# Patient Record
Sex: FEMALE | Race: WHITE | NOT HISPANIC OR LATINO | Employment: OTHER | URBAN - METROPOLITAN AREA
[De-identification: names, ages, dates, MRNs, and addresses within clinical notes are randomized per-mention and may not be internally consistent; named-entity substitution may affect disease eponyms.]

---

## 2017-12-08 ENCOUNTER — OFFICE VISIT (OUTPATIENT)
Dept: FAMILY MEDICINE CLINIC | Facility: CLINIC | Age: 62
End: 2017-12-08

## 2017-12-08 VITALS
SYSTOLIC BLOOD PRESSURE: 118 MMHG | WEIGHT: 158 LBS | OXYGEN SATURATION: 95 % | HEART RATE: 59 BPM | TEMPERATURE: 98.6 F | HEIGHT: 60 IN | RESPIRATION RATE: 14 BRPM | DIASTOLIC BLOOD PRESSURE: 82 MMHG | BODY MASS INDEX: 31.02 KG/M2

## 2017-12-08 DIAGNOSIS — Z00.00 ENCOUNTER FOR HEALTH MAINTENANCE EXAMINATION: Primary | ICD-10-CM

## 2017-12-08 DIAGNOSIS — R73.09 ELEVATED HEMOGLOBIN A1C: ICD-10-CM

## 2017-12-08 DIAGNOSIS — E78.01 FAMILIAL HYPERCHOLESTEROLEMIA: ICD-10-CM

## 2017-12-08 DIAGNOSIS — E55.9 VITAMIN D DEFICIENCY: ICD-10-CM

## 2017-12-08 LAB
25(OH)D3+25(OH)D2 SERPL-MCNC: 35.7 NG/ML (ref 30–100)
ALBUMIN SERPL-MCNC: 4.5 G/DL (ref 3.5–5.2)
ALBUMIN/GLOB SERPL: 2 G/DL
ALP SERPL-CCNC: 67 U/L (ref 39–117)
ALT SERPL-CCNC: 30 U/L (ref 1–33)
AST SERPL-CCNC: 26 U/L (ref 1–32)
BILIRUB SERPL-MCNC: 0.4 MG/DL (ref 0.1–1.2)
BUN SERPL-MCNC: 12 MG/DL (ref 8–23)
BUN/CREAT SERPL: 15.8 (ref 7–25)
CALCIUM SERPL-MCNC: 9.8 MG/DL (ref 8.6–10.5)
CHLORIDE SERPL-SCNC: 102 MMOL/L (ref 98–107)
CHOLEST SERPL-MCNC: 243 MG/DL (ref 0–200)
CO2 SERPL-SCNC: 26.3 MMOL/L (ref 22–29)
CREAT SERPL-MCNC: 0.76 MG/DL (ref 0.57–1)
GFR SERPLBLD CREATININE-BSD FMLA CKD-EPI: 77 ML/MIN/1.73
GFR SERPLBLD CREATININE-BSD FMLA CKD-EPI: 93 ML/MIN/1.73
GLOBULIN SER CALC-MCNC: 2.3 GM/DL
GLUCOSE SERPL-MCNC: 91 MG/DL (ref 65–99)
HBA1C MFR BLD: 5.5 % (ref 4.8–5.6)
HDLC SERPL-MCNC: 72 MG/DL (ref 40–60)
LDLC SERPL CALC-MCNC: 154 MG/DL (ref 0–100)
POTASSIUM SERPL-SCNC: 4.3 MMOL/L (ref 3.5–5.2)
PROT SERPL-MCNC: 6.8 G/DL (ref 6–8.5)
SODIUM SERPL-SCNC: 140 MMOL/L (ref 136–145)
TRIGL SERPL-MCNC: 83 MG/DL (ref 0–150)
VLDLC SERPL CALC-MCNC: 16.6 MG/DL (ref 5–40)

## 2017-12-08 PROCEDURE — 99396 PREV VISIT EST AGE 40-64: CPT | Performed by: FAMILY MEDICINE

## 2017-12-08 NOTE — PROGRESS NOTES
Subjective   Moriah Morris is a 62 y.o. female.     Chief Complaint   Patient presents with   • Annual Exam     Patient needs a yearly physical.        HPI     Patient is here for health maintenance exam.  She's been doing pretty good over the past year.  She states that there was a small amount of time where she was moving and was eating a lot of foods that were bad for her.  She had some weight gain but she's back on her weight loss plan.  She is adhering to a strict diet and exercise regimen.  A year ago lab work revealed an elevated cholesterol level as well as an elevated hemoglobin A1c.  She states she is only when her family does not have diabetes and denies any tobacco, recreational drug use, or alcohol.  She takes no prescription medications.  She also was found to have vitamin D deficiency which she is taking a supplement 4.  She gets yearly mammograms and Pap smears through her OB/GYN    The following portions of the patient's history were reviewed and updated as appropriate: allergies, current medications, past family history, past medical history, past social history, past surgical history and problem list.    Review of Systems   Constitutional: Negative for activity change.   All other systems reviewed and are negative.      Objective  Vitals:    12/08/17 0839   BP: 118/82   Pulse: 59   Resp: 14   Temp: 98.6 °F (37 °C)   SpO2: 95%        Physical Exam   Constitutional: She is oriented to person, place, and time. She appears well-developed and well-nourished. No distress.   HENT:   Head: Normocephalic and atraumatic.   Right Ear: External ear normal.   Left Ear: External ear normal.   Nose: Nose normal.   Mouth/Throat: Oropharynx is clear and moist.   Eyes: Conjunctivae and EOM are normal. Pupils are equal, round, and reactive to light. Right eye exhibits no discharge. Left eye exhibits no discharge. No scleral icterus.   Neck: Normal range of motion. Neck supple.   Cardiovascular: Normal rate, regular  rhythm and normal heart sounds.  Exam reveals no friction rub.    No murmur heard.  Pulmonary/Chest: Effort normal and breath sounds normal. No respiratory distress. She has no wheezes. She has no rales.   Abdominal: Soft. Bowel sounds are normal. She exhibits no distension. There is no tenderness. There is no rebound and no guarding.   Lymphadenopathy:     She has no cervical adenopathy.   Neurological: She is alert and oriented to person, place, and time.   Skin: Skin is warm and dry. She is not diaphoretic.   Nursing note and vitals reviewed.        Current Outpatient Prescriptions:   •  cholecalciferol (VITAMIN D3) 1000 UNITS tablet, Take 1,000 Units by mouth Daily., Disp: , Rfl:   •  acetaminophen-codeine (TYLENOL #3) 300-30 MG per tablet, Take 1 tablet by mouth every 4 (four) hours as needed for moderate pain (4-6)., Disp: 15 tablet, Rfl: 0  •  naproxen (NAPROSYN) 500 MG tablet, TK 1 T PO BID FOR 30 DAYS, Disp: , Rfl: 5    Procedures    Lab Results (most recent)     None          Assessment/Plan   Moriah was seen today for annual exam.    Diagnoses and all orders for this visit:    Encounter for health maintenance examination    Familial hypercholesterolemia  -     Lipid Panel    Elevated hemoglobin A1c  -     Comprehensive Metabolic Panel  -     Hemoglobin A1c    Vitamin D deficiency  -     Vitamin D 25 Hydroxy     Preventative health maintenance and screening as above.  We'll get labs as above to evaluate current status and adjust treatment plan based on those results.  Encouraged patient to continue with her healthy lifestyle.      Return in about 1 year (around 12/8/2018).      Rajesh Leon MD

## 2017-12-27 ENCOUNTER — OFFICE VISIT (OUTPATIENT)
Dept: FAMILY MEDICINE CLINIC | Facility: CLINIC | Age: 62
End: 2017-12-27

## 2017-12-27 VITALS
HEART RATE: 67 BPM | BODY MASS INDEX: 30.74 KG/M2 | SYSTOLIC BLOOD PRESSURE: 116 MMHG | TEMPERATURE: 98 F | DIASTOLIC BLOOD PRESSURE: 72 MMHG | HEIGHT: 60 IN | WEIGHT: 156.6 LBS | OXYGEN SATURATION: 98 %

## 2017-12-27 DIAGNOSIS — J32.9 SINUSITIS, UNSPECIFIED CHRONICITY, UNSPECIFIED LOCATION: ICD-10-CM

## 2017-12-27 DIAGNOSIS — R68.89 FLU-LIKE SYMPTOMS: Primary | ICD-10-CM

## 2017-12-27 LAB
EXPIRATION DATE: NORMAL
FLUAV AG NPH QL: NEGATIVE
FLUBV AG NPH QL: NEGATIVE
INTERNAL CONTROL: NORMAL
Lab: NORMAL

## 2017-12-27 PROCEDURE — 99213 OFFICE O/P EST LOW 20 MIN: CPT | Performed by: NURSE PRACTITIONER

## 2017-12-27 PROCEDURE — 87804 INFLUENZA ASSAY W/OPTIC: CPT | Performed by: NURSE PRACTITIONER

## 2017-12-27 RX ORDER — AMOXICILLIN 250 MG/5ML
POWDER, FOR SUSPENSION ORAL
Qty: 300 ML | Refills: 0 | Status: SHIPPED | OUTPATIENT
Start: 2017-12-27 | End: 2018-11-14

## 2017-12-27 NOTE — PROGRESS NOTES
"Subjective   Moriah Morris is a 62 y.o. female.     History of Present Illness   Upper Respiratory Infection: Patient complains of symptoms of a URI, possible sinusitis. Symptoms include congestion, cough, irritability, plugged sensation in both ears, sore throat and swollen glands. Onset of symptoms was 6 days ago, unchanged since that time. She also c/o congestion and facial pain for the past 3 days .  She is drinking plenty of fluids. Evaluation to date: none. Treatment to date: antihistamines.    The following portions of the patient's history were reviewed and updated as appropriate: allergies, current medications, past social history and problem list.    Review of Systems   HENT: Positive for congestion, postnasal drip, rhinorrhea, sinus pain and sinus pressure.    Respiratory: Positive for cough.    All other systems reviewed and are negative.      Objective   /72  Pulse 67  Temp 98 °F (36.7 °C) (Oral)   Ht 152.4 cm (60\")  Wt 71 kg (156 lb 9.6 oz)  SpO2 98%  BMI 30.58 kg/m2  Physical Exam   Constitutional: She is oriented to person, place, and time. Vital signs are normal. She appears well-developed and well-nourished. No distress.   HENT:   Head: Normocephalic.   Cardiovascular: Normal rate, regular rhythm and normal heart sounds.    Pulmonary/Chest: Effort normal and breath sounds normal.   Neurological: She is alert and oriented to person, place, and time. Gait normal.   Psychiatric: She has a normal mood and affect. Her behavior is normal. Judgment and thought content normal.   Vitals reviewed.      Assessment/Plan   Problem List Items Addressed This Visit        Respiratory    Sinusitis    Relevant Medications    amoxicillin (AMOXIL) 250 MG/5ML suspension       Other    Flu-like symptoms - Primary    Relevant Orders    POCT Influenza A/B           rto prn    "

## 2017-12-28 ENCOUNTER — TELEPHONE (OUTPATIENT)
Dept: FAMILY MEDICINE CLINIC | Facility: CLINIC | Age: 62
End: 2017-12-28

## 2017-12-28 NOTE — TELEPHONE ENCOUNTER
Patient called said she was seen yesterday was prescribed liquid amoxicillin was told to take it bid and when she picked up prescription it said take 10ml tid. How many times a day is she supposed to take the medication?

## 2018-11-14 ENCOUNTER — OFFICE VISIT (OUTPATIENT)
Dept: FAMILY MEDICINE CLINIC | Facility: CLINIC | Age: 63
End: 2018-11-14

## 2018-11-14 VITALS
TEMPERATURE: 98.5 F | OXYGEN SATURATION: 98 % | SYSTOLIC BLOOD PRESSURE: 128 MMHG | BODY MASS INDEX: 29.84 KG/M2 | HEART RATE: 78 BPM | DIASTOLIC BLOOD PRESSURE: 82 MMHG | HEIGHT: 60 IN | WEIGHT: 152 LBS

## 2018-11-14 DIAGNOSIS — R05.9 COUGH: ICD-10-CM

## 2018-11-14 DIAGNOSIS — J32.0 MAXILLARY SINUSITIS, UNSPECIFIED CHRONICITY: Primary | ICD-10-CM

## 2018-11-14 PROCEDURE — 99214 OFFICE O/P EST MOD 30 MIN: CPT | Performed by: FAMILY MEDICINE

## 2018-11-14 RX ORDER — AMOXICILLIN AND CLAVULANATE POTASSIUM 875; 125 MG/1; MG/1
1 TABLET, FILM COATED ORAL 2 TIMES DAILY
Qty: 20 TABLET | Refills: 0 | Status: SHIPPED | OUTPATIENT
Start: 2018-11-14 | End: 2018-11-14 | Stop reason: SDUPTHER

## 2018-11-14 RX ORDER — MONTELUKAST SODIUM 10 MG/1
TABLET ORAL
Refills: 0 | COMMUNITY
Start: 2018-11-11 | End: 2022-12-13

## 2018-11-14 RX ORDER — BENZONATATE 100 MG/1
CAPSULE ORAL
Refills: 0 | COMMUNITY
Start: 2018-11-08 | End: 2019-03-18

## 2018-11-14 RX ORDER — AZELASTINE HYDROCHLORIDE AND FLUTICASONE PROPIONATE 137; 50 UG/1; UG/1
SPRAY, METERED NASAL
Refills: 2 | COMMUNITY
Start: 2018-11-11 | End: 2022-12-13

## 2018-11-14 RX ORDER — AMOXICILLIN AND CLAVULANATE POTASSIUM 875; 125 MG/1; MG/1
1 TABLET, FILM COATED ORAL 2 TIMES DAILY
Qty: 20 TABLET | Refills: 0 | Status: SHIPPED | OUTPATIENT
Start: 2018-11-14 | End: 2018-11-24

## 2018-11-14 RX ORDER — GUAIFENESIN AND CODEINE PHOSPHATE 100; 10 MG/5ML; MG/5ML
5 SOLUTION ORAL 3 TIMES DAILY PRN
Qty: 236 ML | Refills: 0 | Status: SHIPPED | OUTPATIENT
Start: 2018-11-14 | End: 2019-03-18

## 2018-11-14 RX ORDER — GUAIFENESIN AND CODEINE PHOSPHATE 100; 10 MG/5ML; MG/5ML
5 SOLUTION ORAL 3 TIMES DAILY PRN
Qty: 236 ML | Refills: 0 | Status: SHIPPED | OUTPATIENT
Start: 2018-11-14 | End: 2018-11-14 | Stop reason: SDUPTHER

## 2018-11-14 NOTE — PROGRESS NOTES
Moriah Morris is a 63 y.o. female.     Chief Complaint   Patient presents with   • URI     started as sore throat and congestions  and sinus pressure ,cough doesnt help to sleep     New patient to me, PMH of arthritis but otherwise healthy. PMH reviewed.     Sinusitis   This is a new problem. Episode onset:  2 weeks. The problem has been gradually worsening since onset. There has been no fever. Associated symptoms include congestion, coughing and a sore throat. (Sinus pressure, teeth pain, some post nasal drip and cough) Treatments tried: Tesslon pears, dexamethason, dymista and stayhist. The treatment provided mild relief.        The following portions of the patient's history were reviewed and updated as appropriate: allergies, current medications, past family history, past medical history, past social history, past surgical history and problem list.    Review of Systems   HENT: Positive for congestion, postnasal drip and sore throat.    Respiratory: Positive for cough.    Psychiatric/Behavioral: Positive for sleep disturbance.       Objective  Vitals:    11/14/18 0917   BP: 128/82   Pulse: 78   Temp: 98.5 °F (36.9 °C)   SpO2: 98%        Physical Exam   Constitutional: She is oriented to person, place, and time. She appears well-developed and well-nourished. No distress.   HENT:   Head: Normocephalic.   Right Ear: External ear normal.   Left Ear: External ear normal.   Mouth/Throat: Oropharynx is clear and moist. No oropharyngeal exudate.   R maxillary sinus tenderness, erythema in the posterior oropharynx.   Eyes: Conjunctivae are normal. Pupils are equal, round, and reactive to light.   Cardiovascular: Normal rate, regular rhythm, normal heart sounds and intact distal pulses.   No murmur heard.  Pulmonary/Chest: Effort normal and breath sounds normal. No respiratory distress.   Abdominal: Soft. Bowel sounds are normal. She exhibits no distension.   Neurological: She is alert and oriented to person, place, and  time.   Skin: Skin is warm and dry.   Psychiatric: She has a normal mood and affect. Her behavior is normal. Judgment and thought content normal.   Nursing note and vitals reviewed.        Current Outpatient Medications:   •  acetaminophen-codeine (TYLENOL #3) 300-30 MG per tablet, Take 1 tablet by mouth every 4 (four) hours as needed for moderate pain (4-6)., Disp: 15 tablet, Rfl: 0  •  benzonatate (TESSALON) 100 MG capsule, TAKE 1 CAPSULE BY MOUTH THREE TIMES A DAY *DO NOT BREAK CHEW, DISSOLVE, CUT, OR CRUSH*, Disp: , Rfl: 0  •  cholecalciferol (VITAMIN D3) 1000 UNITS tablet, Take 1,000 Units by mouth Daily., Disp: , Rfl:   •  DYMISTA 137-50 MCG/ACT suspension, 1 SPRAY INTO EACH NOSTRIL 2 (TWO) TIMES A DAY FOR 30 DAYS., Disp: , Rfl: 2  •  montelukast (SINGULAIR) 10 MG tablet, TAKE 1 TABLET (10 MG TOTAL) BY MOUTH NIGHTLY FOR 90 DAYS., Disp: , Rfl: 0  •  amoxicillin-clavulanate (AUGMENTIN) 875-125 MG per tablet, Take 1 tablet by mouth 2 (Two) Times a Day for 10 days., Disp: 20 tablet, Rfl: 0  •  guaifenesin-codeine (GUAIFENESIN AC) 100-10 MG/5ML liquid, Take 5 mL by mouth 3 (Three) Times a Day As Needed for Cough., Disp: 236 mL, Rfl: 0    Procedures    Lab Results (most recent)     None              Moriah was seen today for uri.    Diagnoses and all orders for this visit:    Maxillary sinusitis, unspecified chronicity  -     amoxicillin-clavulanate (AUGMENTIN) 875-125 MG per tablet; Take 1 tablet by mouth 2 (Two) Times a Day for 10 days.    Cough  -     guaifenesin-codeine (GUAIFENESIN AC) 100-10 MG/5ML liquid; Take 5 mL by mouth 3 (Three) Times a Day As Needed for Cough.    Other orders  -     Discontinue: amoxicillin-clavulanate (AUGMENTIN) 875-125 MG per tablet; Take 1 tablet by mouth 2 (Two) Times a Day for 10 days.  -     Discontinue: guaifenesin-codeine (GUAIFENESIN AC) 100-10 MG/5ML liquid; Take 5 mL by mouth 3 (Three) Times a Day As Needed for Cough.    New patient to me, Sinus infection not improving with  supportive care, antibiotic prescribed, no allergies or issues with HTN.  She does have difficulty with swallowing tablets.  Recommended to crush the tablet.  Cough not improving with tessalon pearls - unable to sleep, codeine cough syrup  Prescribed.  Flu shot UTD. Fu in the office if not improving.     Return if symptoms worsen or fail to improve.      Jenny Elias MD

## 2019-02-22 ENCOUNTER — OFFICE VISIT (OUTPATIENT)
Dept: FAMILY MEDICINE CLINIC | Facility: CLINIC | Age: 64
End: 2019-02-22

## 2019-02-22 VITALS
DIASTOLIC BLOOD PRESSURE: 86 MMHG | TEMPERATURE: 97.5 F | HEIGHT: 60 IN | OXYGEN SATURATION: 98 % | BODY MASS INDEX: 31.02 KG/M2 | HEART RATE: 61 BPM | SYSTOLIC BLOOD PRESSURE: 132 MMHG | WEIGHT: 158 LBS

## 2019-02-22 DIAGNOSIS — H91.92 HEARING LOSS OF LEFT EAR, UNSPECIFIED HEARING LOSS TYPE: ICD-10-CM

## 2019-02-22 DIAGNOSIS — J06.9 ACUTE URI: Primary | ICD-10-CM

## 2019-02-22 PROCEDURE — 99213 OFFICE O/P EST LOW 20 MIN: CPT | Performed by: FAMILY MEDICINE

## 2019-02-22 NOTE — PROGRESS NOTES
Moriah Morris is a 63 y.o. female.     Chief Complaint   Patient presents with   • Sinus Problem     3 days with sinus congestiosn and sinus pain  ,no fever ,cough runny nose        HPI     Pt is a pleasant 63 y.o. YO female here for sinus pressure.      Moriah Morris 63 y.o. female who presents for evaluation of sinus pressure and congestion. Symptoms include congestion, sore throat, dry cough and runny nose.  Onset of symptoms was 3 days ago, unchanged since that time. Patient denies shortness of breath, dyspnea on exertion, sinus pain, high fever.   Evaluation to date: none Treatment to date:  nasal steroid sprary.     The following portions of the patient's history were reviewed and updated as appropriate: allergies, current medications, past family history, past medical history, past social history, past surgical history and problem list.    Review of Systems   HENT: Positive for congestion, postnasal drip, rhinorrhea, sinus pressure and sinus pain.    Eyes: Negative.    Respiratory: Positive for cough.    Cardiovascular: Negative for chest pain, palpitations and leg swelling.   Endocrine: Negative.    Musculoskeletal: Negative.    Allergic/Immunologic: Negative.    Neurological: Negative.    Hematological: Negative.    Psychiatric/Behavioral: Negative.        Objective  Vitals:    02/22/19 1015   BP: 132/86   Pulse: 61   Temp: 97.5 °F (36.4 °C)   SpO2: 98%        Physical Exam   Constitutional: She is oriented to person, place, and time. She appears well-developed and well-nourished. No distress.   HENT:   Head: Normocephalic.   Right Ear: External ear normal.   Left Ear: External ear normal.   Nose: Nose normal.   Mouth/Throat: Oropharynx is clear and moist. No oropharyngeal exudate.   No sinus tenderness, erythema in the posterior oropharynx.   Eyes: Conjunctivae and EOM are normal. Pupils are equal, round, and reactive to light.   Cardiovascular: Normal rate, regular rhythm, normal heart sounds and intact  distal pulses.   No murmur heard.  Pulmonary/Chest: Effort normal and breath sounds normal. No respiratory distress.   Abdominal: Soft. Bowel sounds are normal. She exhibits no distension.   Musculoskeletal: Normal range of motion.   Neurological: She is alert and oriented to person, place, and time.   Skin: Skin is warm and dry. No rash noted.   Psychiatric: She has a normal mood and affect. Her behavior is normal. Judgment and thought content normal.   Nursing note and vitals reviewed.        Current Outpatient Medications:   •  acetaminophen-codeine (TYLENOL #3) 300-30 MG per tablet, Take 1 tablet by mouth every 4 (four) hours as needed for moderate pain (4-6)., Disp: 15 tablet, Rfl: 0  •  cholecalciferol (VITAMIN D3) 1000 UNITS tablet, Take 1,000 Units by mouth Daily., Disp: , Rfl:   •  DYMISTA 137-50 MCG/ACT suspension, 1 SPRAY INTO EACH NOSTRIL 2 (TWO) TIMES A DAY FOR 30 DAYS., Disp: , Rfl: 2  •  benzonatate (TESSALON) 100 MG capsule, TAKE 1 CAPSULE BY MOUTH THREE TIMES A DAY *DO NOT BREAK CHEW, DISSOLVE, CUT, OR CRUSH*, Disp: , Rfl: 0  •  Cholecalciferol 2000 units capsule, Take  by mouth., Disp: , Rfl:   •  guaifenesin-codeine (GUAIFENESIN AC) 100-10 MG/5ML liquid, Take 5 mL by mouth 3 (Three) Times a Day As Needed for Cough., Disp: 236 mL, Rfl: 0  •  montelukast (SINGULAIR) 10 MG tablet, TAKE 1 TABLET (10 MG TOTAL) BY MOUTH NIGHTLY FOR 90 DAYS., Disp: , Rfl: 0    Procedures    Lab Results (most recent)     None              Moriah was seen today for sinus problem.    Diagnoses and all orders for this visit:    Acute URI    Hearing loss of left ear, unspecified hearing loss type  -     Ambulatory Referral to ENT (Otolaryngology)      Acute upper respiratory infection, continue supportive care with Flonase, Mucinex and vitamins.  Rest hydration recommended.  Additional findings of sudden loss of hearing that has been present for a couple months.  She feels that hearing on her left side is been muffled.  Will  refer to ENT for further evaluation of asymmetric hearing loss.    Return if symptoms worsen or fail to improve.      Jenny Elias MD

## 2019-03-18 ENCOUNTER — OFFICE VISIT (OUTPATIENT)
Dept: FAMILY MEDICINE CLINIC | Facility: CLINIC | Age: 64
End: 2019-03-18

## 2019-03-18 VITALS
BODY MASS INDEX: 30.63 KG/M2 | OXYGEN SATURATION: 96 % | SYSTOLIC BLOOD PRESSURE: 126 MMHG | TEMPERATURE: 98 F | HEART RATE: 56 BPM | HEIGHT: 60 IN | DIASTOLIC BLOOD PRESSURE: 78 MMHG | WEIGHT: 156 LBS

## 2019-03-18 DIAGNOSIS — Z00.00 HEALTH MAINTENANCE EXAMINATION: Primary | ICD-10-CM

## 2019-03-18 DIAGNOSIS — Z12.39 SCREENING FOR BREAST CANCER: ICD-10-CM

## 2019-03-18 DIAGNOSIS — Z13.1 SCREENING FOR DIABETES MELLITUS: ICD-10-CM

## 2019-03-18 DIAGNOSIS — Z13.220 SCREENING FOR LIPID DISORDERS: ICD-10-CM

## 2019-03-18 DIAGNOSIS — E55.9 HYPOVITAMINOSIS D: ICD-10-CM

## 2019-03-18 PROBLEM — R68.89 FLU-LIKE SYMPTOMS: Status: RESOLVED | Noted: 2017-12-27 | Resolved: 2019-03-18

## 2019-03-18 PROBLEM — J32.9 SINUSITIS: Status: RESOLVED | Noted: 2017-12-27 | Resolved: 2019-03-18

## 2019-03-18 LAB
25(OH)D3+25(OH)D2 SERPL-MCNC: 54.8 NG/ML (ref 30–100)
ALBUMIN SERPL-MCNC: 4.4 G/DL (ref 3.5–5.2)
ALBUMIN/GLOB SERPL: 2 G/DL
ALP SERPL-CCNC: 62 U/L (ref 39–117)
ALT SERPL-CCNC: 26 U/L (ref 1–33)
AST SERPL-CCNC: 23 U/L (ref 1–32)
BILIRUB SERPL-MCNC: 0.2 MG/DL (ref 0.2–1.2)
BUN SERPL-MCNC: 11 MG/DL (ref 8–23)
BUN/CREAT SERPL: 16.2 (ref 7–25)
CALCIUM SERPL-MCNC: 10 MG/DL (ref 8.6–10.5)
CHLORIDE SERPL-SCNC: 101 MMOL/L (ref 98–107)
CHOLEST SERPL-MCNC: 230 MG/DL (ref 0–200)
CO2 SERPL-SCNC: 26 MMOL/L (ref 22–29)
CREAT SERPL-MCNC: 0.68 MG/DL (ref 0.57–1)
GLOBULIN SER CALC-MCNC: 2.2 GM/DL
GLUCOSE SERPL-MCNC: 97 MG/DL (ref 65–99)
HBA1C MFR BLD: 5.68 % (ref 4.8–5.6)
HDLC SERPL-MCNC: 75 MG/DL (ref 40–60)
LDLC SERPL CALC-MCNC: 141 MG/DL (ref 0–100)
POTASSIUM SERPL-SCNC: 4.6 MMOL/L (ref 3.5–5.2)
PROT SERPL-MCNC: 6.6 G/DL (ref 6–8.5)
SODIUM SERPL-SCNC: 141 MMOL/L (ref 136–145)
TRIGL SERPL-MCNC: 68 MG/DL (ref 0–150)
VLDLC SERPL CALC-MCNC: 13.6 MG/DL (ref 5–40)

## 2019-03-18 PROCEDURE — 99396 PREV VISIT EST AGE 40-64: CPT | Performed by: FAMILY MEDICINE

## 2019-03-18 NOTE — PROGRESS NOTES
Moriah Morris is a 63 y.o. female.     Chief Complaint   Patient presents with   • Establish Care     new pt establishing with dr patel    • Annual Exam     no complains       HPI     Pt is a pleasant 63 y.o. YO female here for Annual Exam.  PMH includes hyperglycemia.    Health Maintenance   Topic Date Due   • ZOSTER VACCINE (1 of 2) 06/14/2004   • HEPATITIS C SCREENING  05/20/2016   • MAMMOGRAM  05/20/2016   • LIPID PANEL  12/08/2018   • ANNUAL PHYSICAL  12/09/2018   • PAP SMEAR  07/07/2020   • TDAP/TD VACCINES (2 - Td) 05/12/2024   • COLONOSCOPY  12/14/2025   • INFLUENZA VACCINE  Completed     Diet: Diet with lots of sweets.   Exercise: Plays tennis, yoga, cycles and walks (40 mils/ week)     Seeing ortho on Thursday with knee pain - improving will cancel.   Uses IC/ Ibuprofen and rest.     The following portions of the patient's history were reviewed and updated as appropriate: allergies, current medications, past family history, past medical history, past social history, past surgical history and problem list.    Review of Systems   Constitutional: Negative.    HENT: Negative.    Eyes: Negative.    Respiratory: Negative.    Cardiovascular: Negative for chest pain, palpitations and leg swelling.   Gastrointestinal: Negative.    Endocrine: Negative.    Genitourinary: Negative.    Musculoskeletal: Positive for arthralgias (knees) and myalgias.   Allergic/Immunologic: Negative.    Neurological: Negative.    Hematological: Negative.    Psychiatric/Behavioral: Negative.        Objective  Vitals:    03/18/19 0820   BP: 126/78   Pulse: 56   Temp: 98 °F (36.7 °C)   SpO2: 96%        Physical Exam   Constitutional: She is oriented to person, place, and time. She appears well-developed and well-nourished. No distress.   HENT:   Head: Normocephalic.   Nose: Nose normal.   Eyes: EOM are normal.   Neck: No thyromegaly present.   Cardiovascular: Normal rate, regular rhythm, normal heart sounds and intact distal pulses.   No  murmur heard.  Pulmonary/Chest: Effort normal and breath sounds normal. No respiratory distress.   Musculoskeletal: Normal range of motion.   Neurological: She is alert and oriented to person, place, and time.   Skin: Skin is warm and dry. No rash noted.   Psychiatric: She has a normal mood and affect. Her behavior is normal. Judgment and thought content normal.   Nursing note and vitals reviewed.        Current Outpatient Medications:   •  cholecalciferol (VITAMIN D3) 1000 UNITS tablet, Take 1,000 Units by mouth Daily., Disp: , Rfl:   •  Cholecalciferol 2000 units capsule, Take  by mouth., Disp: , Rfl:   •  DYMISTA 137-50 MCG/ACT suspension, 1 SPRAY INTO EACH NOSTRIL 2 (TWO) TIMES A DAY FOR 30 DAYS., Disp: , Rfl: 2  •  montelukast (SINGULAIR) 10 MG tablet, TAKE 1 TABLET (10 MG TOTAL) BY MOUTH NIGHTLY FOR 90 DAYS., Disp: , Rfl: 0    Procedures    Lab Results (most recent)     None              Moriah was seen today for establish care and annual exam.    Diagnoses and all orders for this visit:    Health maintenance examination    Screening for lipid disorders  -     Lipid Panel    Screening for breast cancer  -     Mammo Screening Bilateral With CAD; Future    Screening for diabetes mellitus  -     Hemoglobin A1c  -     Comprehensive Metabolic Panel    Hypovitaminosis D  -     Vitamin D 25 Hydroxy    Patient here to establish care with me.  Establish to me in this office.  Health maintenance visit today, discussed decreasing sweets intake.  She is very active, continue this.  Labs as above.  Previous hyperglycemia with hemoglobin A1c of 5.6 that improved thereafter.  We will recheck level today.  She has never been sexually active, no history of abnormal Paps, last one was about 3 years ago, not due at this time.  Breast cancer screening as above.  Colonoscopy up-to-date.  No other immunizations needed at this time.  Shingles pending when available.  Follow-up if not improving or having new problems.  Otherwise  annually is okay.      Return if symptoms worsen or fail to improve.      Jenny Elias MD

## 2019-03-20 NOTE — PROGRESS NOTES
Please call patient back with results.  The labs has resulted as overall normal, cholesterol slightly elevated but ASCVD risk of 4.6% therefore I would not recommend medication at this time but lifestyle choices.  Additionally your hemoglobin A1c is slightly elevated compared to last year, in the prediabetic range.  Lifestyle changes as well.  We can recheck these in 6 months..    Thank you

## 2021-02-01 ENCOUNTER — IMMUNIZATION (OUTPATIENT)
Dept: VACCINE CLINIC | Facility: HOSPITAL | Age: 66
End: 2021-02-01

## 2021-02-01 PROCEDURE — 91300 HC SARSCOV02 VAC 30MCG/0.3ML IM: CPT | Performed by: INTERNAL MEDICINE

## 2021-02-01 PROCEDURE — 0001A: CPT | Performed by: INTERNAL MEDICINE

## 2021-02-22 ENCOUNTER — IMMUNIZATION (OUTPATIENT)
Dept: VACCINE CLINIC | Facility: HOSPITAL | Age: 66
End: 2021-02-22

## 2021-02-22 PROCEDURE — 0002A: CPT | Performed by: INTERNAL MEDICINE

## 2021-02-22 PROCEDURE — 91300 HC SARSCOV02 VAC 30MCG/0.3ML IM: CPT | Performed by: INTERNAL MEDICINE

## 2022-11-10 ENCOUNTER — OFFICE VISIT (OUTPATIENT)
Dept: ORTHOPEDIC SURGERY | Facility: CLINIC | Age: 67
End: 2022-11-10

## 2022-11-10 ENCOUNTER — TELEPHONE (OUTPATIENT)
Dept: FAMILY MEDICINE CLINIC | Facility: CLINIC | Age: 67
End: 2022-11-10

## 2022-11-10 VITALS — HEIGHT: 61 IN | WEIGHT: 168.2 LBS | TEMPERATURE: 97.5 F | BODY MASS INDEX: 31.75 KG/M2

## 2022-11-10 DIAGNOSIS — R52 PAIN: Primary | ICD-10-CM

## 2022-11-10 DIAGNOSIS — M17.11 PRIMARY OSTEOARTHRITIS OF RIGHT KNEE: ICD-10-CM

## 2022-11-10 PROCEDURE — 99203 OFFICE O/P NEW LOW 30 MIN: CPT | Performed by: NURSE PRACTITIONER

## 2022-11-10 PROCEDURE — 20610 DRAIN/INJ JOINT/BURSA W/O US: CPT | Performed by: NURSE PRACTITIONER

## 2022-11-10 PROCEDURE — 73562 X-RAY EXAM OF KNEE 3: CPT | Performed by: NURSE PRACTITIONER

## 2022-11-10 RX ORDER — MULTIVIT-MIN/IRON/FOLIC ACID/K 18-600-40
CAPSULE ORAL
COMMUNITY
Start: 2019-10-01

## 2022-11-10 RX ORDER — METHYLPREDNISOLONE ACETATE 80 MG/ML
80 INJECTION, SUSPENSION INTRA-ARTICULAR; INTRALESIONAL; INTRAMUSCULAR; SOFT TISSUE
Status: COMPLETED | OUTPATIENT
Start: 2022-11-10 | End: 2022-11-10

## 2022-11-10 RX ORDER — NAPROXEN 500 MG/1
TABLET ORAL
COMMUNITY
Start: 2021-10-01 | End: 2022-11-12

## 2022-11-10 RX ADMIN — METHYLPREDNISOLONE ACETATE 80 MG: 80 INJECTION, SUSPENSION INTRA-ARTICULAR; INTRALESIONAL; INTRAMUSCULAR; SOFT TISSUE at 19:04

## 2022-11-10 NOTE — TELEPHONE ENCOUNTER
Caller: Moriah Morris    Relationship: Self    Best call back number: [phone]      What was the call regarding: PATIENT WOULD LIKE TO SEE IF DR SCRUGGS IS WILLING TO TAKE HER BACK ON AS A PATIENT. PATIENT HAS NOT BEEN SEEN SINCE MARCH OF 2019. PATIENT RECENTLY HAS SEEN AN ORTHOPEDIC AND IS NOW NEEDING LABS DONE TO START A NEW MEDICATION        PLEASE CALL AND ADVISE     Do you require a callback: YES

## 2022-11-10 NOTE — PROGRESS NOTES
Patient: Moriah Morris  YOB: 1955 67 y.o. female  Medical Record Number: [id number]    Chief Complaints:   Chief Complaint   Patient presents with   • Right Knee - Initial Evaluation, Pain       History of Present Illness:Moriah Morris is a 67 y.o. female who presents as a new patient to our practice with complaints of having right knee pain that is chronic in nature.  Patient states her symptoms started in July of this year and have progressively worsened over the last week or so.  Patient denies any recent specific injuries.  Reports the pain is located to the medial aspect of her knee and describes it as a moderate to severe ache.  He states as of recently the pain is becoming pretty consistent which is causing her difficulty walking.  She states that she has had a previous torn meniscus in the left knee.  Patient is currently been treated by Dr. Chaz Mcdaniels in which she has been conservatively treating her with naproxen.  Patient reports that this only gives her some relief.  He denies any signs or symptoms of infection, and she is without any other significant complaints today.    Allergies: No Known Allergies    Medications:   Current Outpatient Medications   Medication Sig Dispense Refill   • Cholecalciferol (Vitamin D) 50 MCG (2000 UT) capsule      • naproxen (NAPROSYN) 500 MG tablet      • cholecalciferol (VITAMIN D3) 1000 UNITS tablet Take 1,000 Units by mouth Daily.     • Cholecalciferol 2000 units capsule Take  by mouth.     • DYMISTA 137-50 MCG/ACT suspension 1 SPRAY INTO EACH NOSTRIL 2 (TWO) TIMES A DAY FOR 30 DAYS.  2   • montelukast (SINGULAIR) 10 MG tablet TAKE 1 TABLET (10 MG TOTAL) BY MOUTH NIGHTLY FOR 90 DAYS.  0     No current facility-administered medications for this visit.         The following portions of the patient's history were reviewed and updated as appropriate: allergies, current medications, past family history, past medical history, past social history, past  "surgical history and problem list.    Review of Systems:   A 14 point review of systems was performed. All systems negative except pertinent positives/negative listed in HPI above    Physical Exam:   Vitals:    11/10/22 1322   Temp: 97.5 °F (36.4 °C)   TempSrc: Temporal   Weight: 76.3 kg (168 lb 3.2 oz)   Height: 154.9 cm (61\")   PainSc:   7   PainLoc: Knee       General: A and O x 3, ASA, NAD    SCLERA:    Normal    DENTITION:   Normal    Knee:  right    ALIGNMENT:     Varus  ,   Patella  tracks  midline    GAIT:    Antalgic    SKIN:    No abnormality    RANGE OF MOTION:   0  - 120 DEG; painful extension    STRENGTH:   4  / 5    LIGAMENTS:    No varus / valgus instability.   Negative  Lachman.    MENISCUS:     Negative   Nyla       DISTAL PULSES:    Paplable    DISTAL SENSATION :   Intact    LYMPHATICS:     No   lymphadenopathy    OTHER:          - Positive   effusion      - Crepitance with ROM       - Medial joint line tenderness      - Palpable Baker's cyst    Radiology:  Xrays 3views (ap,lateral, sunrise) were ordered and reviewed for evaluation of knee pain demonstrating advanced varus osteoarthritis with bone on bone articulation, subchondral cysts, and periarticular osteophytes as well as advanced patellofemoral osteoarthritis.  No other x-rays available for comparison purposes.    Assessment/Plan: Primary osteoarthritis right knee    Treatment options as well as imaging results were discussed in detail with the patient.  At this point in time again to proceed forward with conservative measures.  I am going to give the patient a prescription for physical therapy to work on quad strengthening and range of motion exercises.  I am also going give her a prescription for bilateral lateral heel wedges as she does have advanced osteoarthritis bilateral knees.  I did discuss giving the patient an anti-inflammatory medication such as meloxicam or Celebrex, however it is been 3 years since the patient's had a BMP " lab drawn therefore I instructed her to contact her PCP before taking a daily anti-inflammatory.  I will give her an intra-articular joint injection of cortisone into her right knee today as well.  She can follow back up with me in 3 months for reevaluation.    Large Joint Arthrocentesis: R knee  Date/Time: 11/10/2022 7:04 PM  Consent given by: patient  Site marked: site marked  Timeout: Immediately prior to procedure a time out was called to verify the correct patient, procedure, equipment, support staff and site/side marked as required   Supporting Documentation  Indications: pain and joint swelling   Procedure Details  Location: knee - R knee  Preparation: Patient was prepped and draped in the usual sterile fashion  Needle size: 22 G  Approach: anterolateral  Medications administered: 80 mg methylPREDNISolone acetate 80 MG/ML; 3 mL lidocaine (cardiac)  Patient tolerance: patient tolerated the procedure well with no immediate complications            Agustín Gtz, EMERITA  11/10/2022

## 2022-11-11 ENCOUNTER — TELEPHONE (OUTPATIENT)
Dept: ORTHOPEDIC SURGERY | Facility: CLINIC | Age: 67
End: 2022-11-11

## 2022-11-11 ENCOUNTER — LAB (OUTPATIENT)
Dept: LAB | Facility: HOSPITAL | Age: 67
End: 2022-11-11

## 2022-11-11 ENCOUNTER — PATIENT ROUNDING (BHMG ONLY) (OUTPATIENT)
Dept: ORTHOPEDIC SURGERY | Facility: CLINIC | Age: 67
End: 2022-11-11

## 2022-11-11 DIAGNOSIS — M17.11 PRIMARY OSTEOARTHRITIS OF RIGHT KNEE: Primary | ICD-10-CM

## 2022-11-11 DIAGNOSIS — M17.11 PRIMARY OSTEOARTHRITIS OF RIGHT KNEE: ICD-10-CM

## 2022-11-11 LAB
ANION GAP SERPL CALCULATED.3IONS-SCNC: 13.4 MMOL/L (ref 5–15)
BUN SERPL-MCNC: 18 MG/DL (ref 8–23)
BUN/CREAT SERPL: 22.2 (ref 7–25)
CALCIUM SPEC-SCNC: 9.8 MG/DL (ref 8.6–10.5)
CHLORIDE SERPL-SCNC: 102 MMOL/L (ref 98–107)
CO2 SERPL-SCNC: 22.6 MMOL/L (ref 22–29)
CREAT SERPL-MCNC: 0.81 MG/DL (ref 0.57–1)
EGFRCR SERPLBLD CKD-EPI 2021: 79.2 ML/MIN/1.73
GLUCOSE SERPL-MCNC: 140 MG/DL (ref 65–99)
POTASSIUM SERPL-SCNC: 4.1 MMOL/L (ref 3.5–5.2)
SODIUM SERPL-SCNC: 138 MMOL/L (ref 136–145)

## 2022-11-11 PROCEDURE — 80048 BASIC METABOLIC PNL TOTAL CA: CPT

## 2022-11-11 PROCEDURE — 36415 COLL VENOUS BLD VENIPUNCTURE: CPT

## 2022-11-11 NOTE — TELEPHONE ENCOUNTER
Unfortunately next site where I could work her in as an inpatient would be in June.  I would recommend that she see one of the 2 physicians that are in our practice that also will take great care of her.

## 2022-11-11 NOTE — PROGRESS NOTES
A Jade Magnet Message has been sent to the patient for PATIENT ROUNDING with [facility]

## 2022-11-11 NOTE — TELEPHONE ENCOUNTER
I called and spoke to the patient in regards to her questions and concerns.  Patient is unfortunately not able to get into see her PCP until June.  Therefore I am going to order a BMP lab to evaluate the patient's creatinine level.  Should she be in a normal range and we will start her on Celebrex.  I have encouraged her to keep her appointment with her doctor in June and they can take over prescribing this medicine going forward at that time if her labs are normal.

## 2022-11-11 NOTE — TELEPHONE ENCOUNTER
Provider: EMERITA PARSONS    Caller: RUDDY THORNTON    Relationship to Patient: SELF    Phone Number: [phone]    Reason for Call: PT STATED THAT MARITO EXPLAINED TO HER THAT SHE WILL NEED LABS DRAWN PRIOR TO HER STARTING CELEBREX. PT CALLED HER PCP BUT THEY CAN NOT GET HER IN UNTIL June. SHE IS WANTING TO KNOW IF SHE CAN GO TO A [place] FACILITY TO GET LABS OR AN IMMEDIATE CARE?    PLEASE CALL PATIENT AND ADVISE. THANK YOU

## 2022-11-12 RX ORDER — CELECOXIB 200 MG/1
200 CAPSULE ORAL DAILY
Qty: 30 CAPSULE | Refills: 2 | Status: SHIPPED | OUTPATIENT
Start: 2022-11-12 | End: 2023-02-08

## 2022-11-12 NOTE — PROGRESS NOTES
I called and notified the patient that her lab works were normal for kidney function.  Therefore I went ahead and sent in her prescription for Celebrex 200 mg p.o. daily as needed for arthritic pain.  She gets back established with her family doctor they can take over managing this prescription.

## 2022-11-16 ENCOUNTER — TELEPHONE (OUTPATIENT)
Dept: ORTHOPEDIC SURGERY | Facility: CLINIC | Age: 67
End: 2022-11-16

## 2022-11-16 NOTE — TELEPHONE ENCOUNTER
Provider: ANGELITA  Caller: RUDDY THORNTON  Relationship to Patient: PATIENT    Phone Number: [phone]    Reason for Call: PATIENT NEEDS TO KNOW HOW LONG TO KEEP TAKING THE CELEBREX    When was the patient last seen: 11.10.22

## 2022-11-21 ENCOUNTER — TREATMENT (OUTPATIENT)
Dept: PHYSICAL THERAPY | Facility: CLINIC | Age: 67
End: 2022-11-21

## 2022-11-21 DIAGNOSIS — M25.561 ACUTE PAIN OF RIGHT KNEE: Primary | ICD-10-CM

## 2022-11-21 DIAGNOSIS — Z74.09 IMPAIRED FUNCTIONAL MOBILITY AND ACTIVITY TOLERANCE: ICD-10-CM

## 2022-11-21 PROCEDURE — 97162 PT EVAL MOD COMPLEX 30 MIN: CPT | Performed by: PHYSICAL THERAPIST

## 2022-11-21 PROCEDURE — 97530 THERAPEUTIC ACTIVITIES: CPT | Performed by: PHYSICAL THERAPIST

## 2022-11-21 PROCEDURE — 97140 MANUAL THERAPY 1/> REGIONS: CPT | Performed by: PHYSICAL THERAPIST

## 2022-11-21 PROCEDURE — 97110 THERAPEUTIC EXERCISES: CPT | Performed by: PHYSICAL THERAPIST

## 2022-11-21 NOTE — PROGRESS NOTES
[facility] [place]  [address] [address]    Physical Therapy Initial Evaluation and Plan of Care    Patient: Moriah Morris   : 1955  Diagnosis/ICD-10 Code:  Acute pain of right knee [M25.561]  Referring practitioner: EMERITA Mg  NPI: [id number]                                      Date of Initial Visit: 2022  Today's Date: 2022  Patient seen for 1 session         Visit Diagnoses:    ICD-10-CM ICD-9-CM   1. Acute pain of right knee  M25.561 719.46   2. Impaired functional mobility and activity tolerance  Z74.09 V49.89         Subjective Questionnaire: WOMAC 17/96 - 17.7%       Subjective Evaluation    History of Present Illness  Date of onset: 2022  Mechanism of injury: R knee pain - OA - got injection at 11/10 visit with Agustín and it has helped.  Pt reports that she helped her niece move in August and aggravated knee.  Then tried some PT and that was successful.  Then a couple of weeks ago she could barely move her knee after getting out of bed.  Had played tennis and cycled in the days prior with no particular incident.      Pt is an avid outdoor cyclist (for 42 year)- has taken couple of weeks off.  She does tend to get off her bike onto her R leg. She has not changed routes/bike settings.  Had been cycling 4 days/week for 20 miles   Also had been been playing doubles tennis 1-2 days/week and home yoga workouts    - locking/giving way.   + sleep disturbance.  Initially noticed swelling in knees.      Pain increases with activity.      PMH - L ankle surgery      Radiology:  Xrays 3views (ap,lateral, sunrise) were ordered and reviewed for evaluation of knee pain demonstrating advanced varus osteoarthritis with bone on bone articulation, subchondral cysts, and periarticular osteophytes as well as advanced patellofemoral osteoarthritis.  No other x-rays available for comparison purposes.          Patient Occupation: retired -    Pain  Current pain ratin  At worst pain ratin (up to 9 before injection)  Location: R medial knee  Quality: sharp  Alleviating factors: Celebrex - only took for 4 days.  Aggravating factors: sleeping, squatting and ambulation    Social Support  Lives in: one-story house (stairs to basement)    Patient Goals  Patient goals for therapy: return to sport/leisure activities and decreased pain             Objective          Static Posture     Knee   Genu varus.     Tenderness     Right Knee   Tenderness in the medial joint line and pes anserinus.     Active Range of Motion   Left Knee   Flexion: 130 degrees   Extension: 3 degrees     Right Knee   Flexion: 129 degrees   Extension: 5 degrees with pain    Additional Active Range of Motion Details  PF crepitus R>L    Strength/Myotome Testing     Left Hip   Planes of Motion   Flexion: 3+ (long lever arm)  Extension: 3+    Right Hip   Planes of Motion   Flexion: 3+ (long lever arm)  Extension: 3+  Abduction: 5  Adduction: 5    Right Knee   Flexion: 5  Extension: 5    Additional Strength Details  BeActivated 123 testing    Sagittal System  Flexibility - HS R 80 degrees L80 degrees                     Psoas MMT tested supine in slight abd/hip ER R 3+/5 L 3+/5                               with ankle PF(quad) R 3+/5 L 3+/5                               with ankle DF (ant tib) R 3+/5 L 3+/5                                 Contralateral arm fist R 5/5 L 5/5                                                 Breathing pattern - diaphragmatic    Glut Max tested prone with knee flex R 3+/5 L 3+/5        Tests     Right Hip   Positive Isabel.     Right Knee   Negative bounce home, medial Nyla and valgus stress test at 30 degrees (but painful).     Ambulation   Weight-Bearing Status   Assistive device used: none    Observational Gait   Gait: within functional limits     Functional Assessment     Single Leg Stance   Left: 30 seconds  Right: 30 seconds    Comments  5TSSTS - 7.64            Assessment & Plan     Assessment  Impairments: abnormal or restricted ROM, activity intolerance, impaired physical strength, lacks appropriate home exercise program and pain with function  Functional Limitations: sleeping, walking, uncomfortable because of pain, standing and stooping  Assessment details: Moriah Morris is a 67 y.o. female referred to physical therapy for R knee pain. She presents with decreased core stability/strength with 333 sh pattern likely impacted by years of cycling, medial knee pain with sit to stand, palp tenderness, B genu varus, extensive PF crepitus and tight IT bands .She presents with an evolving clinical presentation.  She has comorbidities including B knee OA and a very active lifestyle that may affect her progress in the plan of care.  Pt would benefit from skilled PT services in order to address listed impairments and increase tolerance to normal daily activities including ADLs and recreational activities.  During evaluation, pt educated on anatomy, goal of interventions, jt protection, and body mechanics to promote healthy lifestyle and improve quality of life.    Prognosis: good    Goals  Plan Goals: STG In 4 weeks  1. Pt to exhibit compliance/independence with HEP without exacerbation of symptoms.  2. Pt to exhibit increased R knee extension AROM to 3 degrees without pain to ability to get off her bike.  3. Pt will sit to stand without increased R knee pain  4. Pt is able to resume cycling for at least 15 min with acceptable symptoms  5. Patient will be independent with education for symptom management, joint protection and strategies to minimize stress on affected tissues    LTG In 6-8 weeks  1. Pt to exhibit >/= 4+/5 hip/core MMT to demonstrate improved strength to allow for improved tolerance to ambulation and stairclimbing.  2. WOMAC </= 13% to demonstrate improved functional tolerance to ADLs  3. - sleep disturbance  4. Patient will demonstrate an independent HEP for  core and knee strength and flexibility/ROM.      Plan  Therapy options: will be seen for skilled therapy services  Planned modality interventions: ultrasound and cryotherapy  Other planned modality interventions: kinesiotape  Planned therapy interventions: neuromuscular re-education, therapeutic activities, strengthening, stretching, home exercise program, balance/weight-bearing training and manual therapy  Frequency: 2x week  Duration in weeks: 4  Treatment plan discussed with: patient  Plan details: Access Code: [email or web address]  URL: [email or web address]  Date: 11/21/2022  Prepared by: Keya Stuart    Exercises  Straight Leg Raise with External Rotation - 1 x daily - 1 sets - 10 reps - 3 hold  Supine Gluteal Sets - 1 x daily - 1 sets - 10 reps - 5 hold  Supine Bridge - 1 x daily - 1 sets - 15 reps - 5 hold  Supine Hamstring Stretch with Strap - 1 x daily - 1 sets - 2 reps - 30 hold  Supine ITB Stretch with Strap - 1 x daily - 1 sets - 2 reps - 30 hold  Standing Gluteal Sets - 1 x daily - 1 sets - 10 reps - 5 hold  Ice - 1 x daily - 1 sets - 10 min    Patient Education  Knee Osteoarthritis  Hold cycling, yoga and tennis until we can determine response of HEP  Ice after ex  Consider water aerobics        History # of Personal Factors and/or Comorbidities: MODERATE (1-2)  Examination of Body System(s): # of elements: MODERATE (3)  Clinical Presentation: EVOLVING  Clinical Decision Making: MODERATE      Timed:         Manual Therapy:    10     mins  41751;     Therapeutic Exercise:    10     mins  39189;     Neuromuscular Pearl:    -    mins  75421;    Therapeutic Activity:     10     mins  17951;     Gait Training:      -     mins  50517;     Ultrasound:     8     mins  27014;    Ionto                               -    mins   70214  Self Care                       -     mins   30091    Un-Timed:  Electrical Stimulation:    -     mins  44865 (MC );  Dry Needling     -     mins self-pay  Traction     -      mins 27218  Low Eval     -     Mins  50432  Mod Eval     20     Mins  86374  High Eval                       -     Mins  38590    Timed Treatment:   38   mins   Total Treatment:     68   mins    PT: Keya Stuart PT     [place] License # [id number]    Electronically signed by Keya Stuart PT, 11/21/2022, 5:07pm EST    Certification Period: 11/21/2022 thru 2/18/2023  I certify that the therapy services are furnished while this patient is under my care.  The services outlined above are required by this patient, and will be reviewed every 90 days.         Physician Signature:__________________________________________________    PHYSICIAN: Agustín Gtz APRN      DATE:     Please sign and return via fax to [phone]. Thank you, [facility].

## 2022-11-29 ENCOUNTER — TREATMENT (OUTPATIENT)
Dept: PHYSICAL THERAPY | Facility: CLINIC | Age: 67
End: 2022-11-29

## 2022-11-29 DIAGNOSIS — M25.561 ACUTE PAIN OF RIGHT KNEE: Primary | ICD-10-CM

## 2022-11-29 DIAGNOSIS — Z74.09 IMPAIRED FUNCTIONAL MOBILITY AND ACTIVITY TOLERANCE: ICD-10-CM

## 2022-11-29 PROCEDURE — 97110 THERAPEUTIC EXERCISES: CPT | Performed by: PHYSICAL THERAPIST

## 2022-11-29 PROCEDURE — 97035 APP MDLTY 1+ULTRASOUND EA 15: CPT | Performed by: PHYSICAL THERAPIST

## 2022-11-29 NOTE — PROGRESS NOTES
Physical Therapy Daily Treatment Note      Patient: Moriah Morris   : 1955  Referring practitioner: EMERITA Mg  Date of Initial Visit: Type: THERAPY  Noted: 2022  Today's Date: 2022  Patient seen for 2 sessions       Visit Diagnoses:    ICD-10-CM ICD-9-CM   1. Acute pain of right knee  M25.561 719.46   2. Impaired functional mobility and activity tolerance  Z74.09 V49.89       Subjective   Moriah Morris reports: is up to 30 min on stationary bike at home; had to raise seat height.  Already did ex this morning. Anxious to cycle outdoors; has pedal assist to help with hills. Had some initial increased soreness after the PT eval but better now. Tape was helpful and bought some of my own. Wondered if [facility] was [facility] after we talked about it last visit.  Ok with giving up tennis to avoid knee replacement.     Pain Rating (0-10): 1/10 this morning    Objective   See Exercise, Manual, and Modality Logs for complete treatment.   Psoas MMT tested supine in slight abd/hip ER R 4+/5 L 4+/5      Assessment/Plan   Pt is responding favorably to PT with decreasing pain, improved core strength and compliance with HEP.  Good tolerance to ex progressions and was given updated printout for compliance.  We discussed [facility] and focusing on water aerobics to build strength/endurance while facilitating jt protection.   Progress per Plan of Care  Start with 15 min of outdoor cycle focusing on flat terrain  Join [facility]            Timed:         Manual Therapy:    -     mins  61843;     Therapeutic Exercise:    27     mins  03020;     Neuromuscular Pearl:    3    mins  41832;    Therapeutic Activity:     -     mins  77113;     Gait Training:      -     mins  88157;     Ultrasound:     8     mins  50500;    Ionto                               -    mins   49828  Self Care                       -     mins   97250      Timed Treatment:   38   mins   Total Treatment:      48   mins    Keya Stuart, PT  [place] License: #[id number]

## 2022-12-01 ENCOUNTER — TREATMENT (OUTPATIENT)
Dept: PHYSICAL THERAPY | Facility: CLINIC | Age: 67
End: 2022-12-01

## 2022-12-01 DIAGNOSIS — Z74.09 IMPAIRED FUNCTIONAL MOBILITY AND ACTIVITY TOLERANCE: ICD-10-CM

## 2022-12-01 DIAGNOSIS — M25.561 ACUTE PAIN OF RIGHT KNEE: Primary | ICD-10-CM

## 2022-12-01 PROCEDURE — 97110 THERAPEUTIC EXERCISES: CPT | Performed by: PHYSICAL THERAPIST

## 2022-12-01 PROCEDURE — 97140 MANUAL THERAPY 1/> REGIONS: CPT | Performed by: PHYSICAL THERAPIST

## 2022-12-01 NOTE — PROGRESS NOTES
Physical Therapy Daily Treatment Note      Patient: Moriah Morris   : 1955  Referring practitioner: EMERITA Mg  Date of Initial Visit: Type: THERAPY  Noted: 2022  Today's Date: 2022  Patient seen for 3 sessions       Visit Diagnoses:    ICD-10-CM ICD-9-CM   1. Acute pain of right knee  M25.561 719.46   2. Impaired functional mobility and activity tolerance  Z74.09 V49.89       Subjective   Moriah Morris reports: used the swiss ball ball at home but it was too large and aggravated both on my knees so had to take a Celebrex; better today.  .  I joined the [facility] and want to try chair yoga and aqua aerobics.  Noticed some swelling in L knee after last treatment; not sure if it was the tape. Went to [employer] and got new shoes and they seem to help.  Will try outdoor cycling on  if it doesn't rain.     Pain Rating (0-10): 1/10 this morning; 3/10 yesterday    Objective          Palpation     Right Tenderness of the posterior tibialis.   Trigger point to posterior tibialis.     Tenderness     Right Knee   Tenderness in the medial joint line and pes anserinus.       See Exercise, Manual, and Modality Logs for complete treatment.       Assessment/Plan   Pt presents with post tib tenderness along with persistent medial knee pain though good tolerance to ex program this date after flare up at home due to large size of physioball.  Added gentle manual STM/jt mobilizations with good tolerance. We discussed gradually adding in planned activities at the [facility] and to see how she feels before adding an outdoor bike ride.    Progress per Plan of Care  Start with 15 min of outdoor cycle focusing on flat terrain  Ice after workout at [facility]           Timed:         Manual Therapy:    8     mins  32448;     Therapeutic Exercise:    15     mins  48325;     Neuromuscular Pearl:    -    mins  74757;    Therapeutic Activity:     5     mins  70546;     Gait Training:      -     mins  87034;     Ultrasound:     8      mins  30923;    Ionto                               -    mins   79704  Self Care                       -     mins   25349      Timed Treatment:   36   mins   Total Treatment:     36   mins    Keya Stuart, PT  [place] License: #[id number]

## 2022-12-05 ENCOUNTER — TREATMENT (OUTPATIENT)
Dept: PHYSICAL THERAPY | Facility: CLINIC | Age: 67
End: 2022-12-05

## 2022-12-05 DIAGNOSIS — M25.561 ACUTE PAIN OF RIGHT KNEE: Primary | ICD-10-CM

## 2022-12-05 DIAGNOSIS — Z74.09 IMPAIRED FUNCTIONAL MOBILITY AND ACTIVITY TOLERANCE: ICD-10-CM

## 2022-12-05 DIAGNOSIS — M17.12 ARTHRITIS OF LEFT KNEE: ICD-10-CM

## 2022-12-05 PROCEDURE — 97035 APP MDLTY 1+ULTRASOUND EA 15: CPT | Performed by: PHYSICAL THERAPIST

## 2022-12-05 PROCEDURE — 97110 THERAPEUTIC EXERCISES: CPT | Performed by: PHYSICAL THERAPIST

## 2022-12-05 PROCEDURE — 97140 MANUAL THERAPY 1/> REGIONS: CPT | Performed by: PHYSICAL THERAPIST

## 2022-12-05 NOTE — PROGRESS NOTES
"   Physical Therapy Daily Treatment Note      Patient: Moriah Morris   : 1955  Referring practitioner: EMERITA Mg  Date of Initial Visit: Type: THERAPY  Noted: 2022  Today's Date: 2022  Patient seen for 4 sessions         Moriah Morris reports: she is \"robyn sore and I don't know why\". I started water aerobics on Friday and possible soreness from that. Celebrex one day over the weekend. R knee pain /10 and L knee pain 2/10. Pt reports compliance with exercises. Has been doing a stationary bike at the [facility].        Objective   Tenderness along  B post tib    See Exercise, Manual, and Modality Logs for complete treatment.       Assessment/Plan  Pt instructed to stretch after her water aerobics and biking to prevent further muscle tension. Added LAQs for quad control and ankle alphabet for ankle mobility today. Continued US and manual STM to R posterior tib for decreased tenderness and muscle tension. Pt reports when she is up on her toes \"like running in place\" her foot cramps up; may benefit from calf/soleus stretches next session.        Progress per Plan of Care and Progress strengthening /stabilization /functional activity           Timed:  Manual Therapy:    8     mins  10183;  Therapeutic Exercise:    27     mins  60953;     Neuromuscular Pearl:    -    mins  16681;    Therapeutic Activity:     -     mins  56228;     Gait Training:      -     mins  67879;     Ultrasound:     8     mins  28200;      Untimed:  Electrical Stimulation:    -     mins  51981 ( );  Mechanical Traction:    -     mins  71718;   Dry needling:      -     mins  48752/    Timed Treatment:   43   mins   Total Treatment:     45   mins    Jen Salvador PT  Physical Therapist  License #: [id number]                "

## 2022-12-09 ENCOUNTER — TREATMENT (OUTPATIENT)
Dept: PHYSICAL THERAPY | Facility: CLINIC | Age: 67
End: 2022-12-09

## 2022-12-09 DIAGNOSIS — M25.561 ACUTE PAIN OF RIGHT KNEE: Primary | ICD-10-CM

## 2022-12-09 DIAGNOSIS — M17.12 ARTHRITIS OF LEFT KNEE: ICD-10-CM

## 2022-12-09 DIAGNOSIS — Z74.09 IMPAIRED FUNCTIONAL MOBILITY AND ACTIVITY TOLERANCE: ICD-10-CM

## 2022-12-09 PROCEDURE — 97110 THERAPEUTIC EXERCISES: CPT | Performed by: PHYSICAL THERAPIST

## 2022-12-09 PROCEDURE — 97140 MANUAL THERAPY 1/> REGIONS: CPT | Performed by: PHYSICAL THERAPIST

## 2022-12-09 PROCEDURE — 97035 APP MDLTY 1+ULTRASOUND EA 15: CPT | Performed by: PHYSICAL THERAPIST

## 2022-12-09 NOTE — PROGRESS NOTES
Physical Therapy Daily Treatment Note    [facility]  [address], [place] [id number] [phone] (phone)  [phone] (fax)    Patient: Moriah Morris   : 1955  Diagnosis/ICD-10 Code:  Acute pain of right knee [M25.561]  Referring practitioner: EMERITA Mg  Date of Initial Visit: Type: THERAPY  Noted: 2022  Today's Date: 2022  Patient seen for 5 sessions           Subjective   Patient reports she thinks her knees are better overall but some days are better than others. Sometimes it seems weather dependent.     Objective     See Exercise, Manual, and Modality Logs for complete treatment.     Assessment/Plan  Advised patient to avoid any sort of jumping exercise even in the water. Both knees are still painful but the STM and US seem to be helpful in reducing pain levels. Better muscle endurance with sidelying strengthening today. Patient plans to bike outside  for the first time (15 min) to see how well she tolerates it.          Timed:    Manual Therapy:    12     mins  57357;  Therapeutic Exercise:    20     mins  83173;     Neuromuscular Pearl:        mins  74867;    Therapeutic Activity:          mins  01200;     Gait Training:           mins  70222;     Ultrasound:     8     mins  00058;   Electrical Stimulation:         mins  51972 ( );  Iontophoresis         mins 00783;  Aquatic Therapy         mins 90208;    Untimed:  Electrical Stimulation:         mins  77135 ( );  Traction:         mins  24175;   Dry Needling   (1-2 muscles)       mins  (Self-pay)  Dry Needling (3-4 muscles)        mins  (Self-pay)  Dry Needling Trial          mins DRYNDLTRIAL  (No Charge)    Timed Treatment:   40   mins   Total Treatment:     50   mins    Iza Crawford PT  Physical Therapist    [place] License:[id number]

## 2022-12-13 ENCOUNTER — TREATMENT (OUTPATIENT)
Dept: PHYSICAL THERAPY | Facility: CLINIC | Age: 67
End: 2022-12-13

## 2022-12-13 ENCOUNTER — OFFICE VISIT (OUTPATIENT)
Dept: FAMILY MEDICINE CLINIC | Facility: CLINIC | Age: 67
End: 2022-12-13

## 2022-12-13 VITALS
HEART RATE: 66 BPM | WEIGHT: 165 LBS | BODY MASS INDEX: 31.15 KG/M2 | DIASTOLIC BLOOD PRESSURE: 80 MMHG | HEIGHT: 61 IN | SYSTOLIC BLOOD PRESSURE: 122 MMHG | OXYGEN SATURATION: 98 % | TEMPERATURE: 98.2 F | RESPIRATION RATE: 16 BRPM

## 2022-12-13 DIAGNOSIS — Z13.29 SCREENING FOR THYROID DISORDER: ICD-10-CM

## 2022-12-13 DIAGNOSIS — M17.12 ARTHRITIS OF LEFT KNEE: ICD-10-CM

## 2022-12-13 DIAGNOSIS — M85.80 OSTEOPENIA, UNSPECIFIED LOCATION: ICD-10-CM

## 2022-12-13 DIAGNOSIS — E55.9 VITAMIN D DEFICIENCY: ICD-10-CM

## 2022-12-13 DIAGNOSIS — Z74.09 IMPAIRED FUNCTIONAL MOBILITY AND ACTIVITY TOLERANCE: ICD-10-CM

## 2022-12-13 DIAGNOSIS — M25.562 PAIN IN BOTH KNEES, UNSPECIFIED CHRONICITY: ICD-10-CM

## 2022-12-13 DIAGNOSIS — R73.03 PREDIABETES: ICD-10-CM

## 2022-12-13 DIAGNOSIS — M85.88 OTHER SPECIFIED DISORDERS OF BONE DENSITY AND STRUCTURE, OTHER SITE: ICD-10-CM

## 2022-12-13 DIAGNOSIS — Z23 IMMUNIZATION DUE: ICD-10-CM

## 2022-12-13 DIAGNOSIS — M25.561 PAIN IN BOTH KNEES, UNSPECIFIED CHRONICITY: ICD-10-CM

## 2022-12-13 DIAGNOSIS — Z76.89 ENCOUNTER TO ESTABLISH CARE: Primary | ICD-10-CM

## 2022-12-13 DIAGNOSIS — Z12.31 ENCOUNTER FOR SCREENING MAMMOGRAM FOR MALIGNANT NEOPLASM OF BREAST: ICD-10-CM

## 2022-12-13 DIAGNOSIS — E78.2 MIXED HYPERLIPIDEMIA: ICD-10-CM

## 2022-12-13 DIAGNOSIS — M25.561 ACUTE PAIN OF RIGHT KNEE: Primary | ICD-10-CM

## 2022-12-13 PROBLEM — H35.3131 EARLY DRY STAGE NONEXUDATIVE AGE-RELATED MACULAR DEGENERATION OF BOTH EYES: Status: ACTIVE | Noted: 2022-12-13

## 2022-12-13 PROCEDURE — 90677 PCV20 VACCINE IM: CPT | Performed by: NURSE PRACTITIONER

## 2022-12-13 PROCEDURE — 97140 MANUAL THERAPY 1/> REGIONS: CPT | Performed by: PHYSICAL THERAPIST

## 2022-12-13 PROCEDURE — G0009 ADMIN PNEUMOCOCCAL VACCINE: HCPCS | Performed by: NURSE PRACTITIONER

## 2022-12-13 PROCEDURE — 99214 OFFICE O/P EST MOD 30 MIN: CPT | Performed by: NURSE PRACTITIONER

## 2022-12-13 PROCEDURE — 97110 THERAPEUTIC EXERCISES: CPT | Performed by: PHYSICAL THERAPIST

## 2022-12-13 NOTE — PROGRESS NOTES
"Patient ID: Moriah Morris is a 67 y.o. female     Patient Care Team:  Head, EMERITA Russell as PCP - General (Nurse Practitioner)    Subjective     Chief Complaint   Patient presents with   • Establish Care   • Osteoarthritis       History of Present Illness    Moriah Morris presents to [facility] today to establish care with our practice.  Previous provider was Dr. Jenny Elias.  Primary concern is needing to establish in order to have blood work checked to get Celebrex prescription which she has been taking as needed for chronic knee pain.  This is followed by Ortho.  She is also undergoing physical therapy.    Trying to watch diet and incorporate exercise into daily routine.  Has lost 2 1/2 pounds in past month.      Health Habits:  Dental Exam: Up to date  Eye Exam: Up to date.  History of mild macular degeneration.  Diet: Nothing specific except limiting sweets and decreasing carbs.    Exercise: 5 times/week.  Current exercise activities include: walking, swimming, and cycling  Pap:  Past due. ?2016  Mammogram: Mammo Screening Bilateral With CAD (07/05/2016 12:17)   Dexa: \"Years ago\".  Osteopenia  Colonoscopy: 2015?  Normal.      She denies any complaints of fever, chills, cough, chest pain, shortness of air, abdominal pain, nausea, or any other concerns.     The following portions of the patient's history were reviewed and updated as appropriate: allergies, current medications, past family history, past medical history, past social history, past surgical history and problem list.       ROS    Vitals:    12/13/22 0910   BP: 122/80   Pulse: 66   Resp: 16   Temp: 98.2 °F (36.8 °C)   SpO2: 98%       Documented weights    12/13/22 0910   Weight: 74.8 kg (165 lb)     Body mass index is 31.18 kg/m².    Results for orders placed or performed in visit on 11/11/22   Basic Metabolic Panel    Specimen: Blood   Result Value Ref Range    Glucose 140 (H) 65 - 99 mg/dL    BUN 18 8 - 23 mg/dL    " Creatinine 0.81 0.57 - 1.00 mg/dL    Sodium 138 136 - 145 mmol/L    Potassium 4.1 3.5 - 5.2 mmol/L    Chloride 102 98 - 107 mmol/L    CO2 22.6 22.0 - 29.0 mmol/L    Calcium 9.8 8.6 - 10.5 mg/dL    BUN/Creatinine Ratio 22.2 7.0 - 25.0    Anion Gap 13.4 5.0 - 15.0 mmol/L    eGFR 79.2 >60.0 mL/min/1.73           Objective     Physical Exam  Constitutional:       Appearance: She is well-developed.   HENT:      Head: Normocephalic and atraumatic.      Right Ear: Tympanic membrane normal.      Left Ear: Tympanic membrane normal.   Eyes:      Pupils: Pupils are equal, round, and reactive to light.   Cardiovascular:      Rate and Rhythm: Normal rate and regular rhythm.      Heart sounds: Normal heart sounds. No murmur heard.  Pulmonary:      Effort: Pulmonary effort is normal.      Breath sounds: Normal breath sounds. No wheezing, rhonchi or rales.   Abdominal:      General: Bowel sounds are normal.      Palpations: Abdomen is soft.      Tenderness: There is no abdominal tenderness.   Musculoskeletal:         General: No tenderness. Normal range of motion.      Cervical back: Normal range of motion and neck supple.   Skin:     General: Skin is warm and dry.      Findings: No erythema or rash.   Neurological:      Mental Status: She is alert and oriented to person, place, and time.   Psychiatric:         Behavior: Behavior normal.            Assessment & Plan     Assessment/Plan     Diagnoses and all orders for this visit:    1. Encounter to establish care (Primary)    2. Prediabetes  -     Comprehensive Metabolic Panel; Future  -     Hemoglobin A1c; Future  -     Lipid Panel With / Chol / HDL Ratio; Future    3. Mixed hyperlipidemia  -     Comprehensive Metabolic Panel; Future  -     Hemoglobin A1c; Future  -     Lipid Panel With / Chol / HDL Ratio; Future  -     TSH Rfx On Abnormal To Free T4; Future    4. Screening for thyroid disorder  -     TSH Rfx On Abnormal To Free T4; Future    5. Vitamin D deficiency  -     Vitamin  D,25-Hydroxy; Future    6. Pain in both knees, unspecified chronicity  -     CBC (No Diff); Future  -     Comprehensive Metabolic Panel; Future    7. Immunization due  -     Pneumococcal Conjugate Vaccine 20-Valent (PCV20)    8. Encounter for screening mammogram for malignant neoplasm of breast  -     Mammo screening digital tomosynthesis bilateral w CAD; Future    9. Osteopenia, unspecified location  -     DEXA Bone Density Axial; Future    10. Other specified disorders of bone density and structure, other site  -     DEXA Bone Density Axial; Future          Summary:  Moriah Morris presented to office today to establish care with our practice.  Denies any new problems or concerns.  She is past due for screenings.  Ordered mammogram and bone density scan.  We will have her return in approximately 3 months for next recheck appointment.  We will complete fasting labs same day.  We will also complete Pap and Medicare wellness.    In the meantime, instructed to contact us sooner for any problems or concerns.    Follow Up:  Return in about 3 months (around 3/13/2023) for Recheck with Pap with same day fasting labs , Medicare Wellness.    Patient was given instructions and counseling regarding condition or for health maintenance advice.  Please see specific information pulled into the AVS if appropriate.      Patient was wearing facemask when I entered the room and throughout our encounter. Protective equipment was worn throughout this patient encounter including a face mask.  Hand hygiene was performed before donning protective equipment and after removal when leaving the room.     Kiarra Rosa, EMERITA  Family Medicine  [facility]  12/13/22  10:00 EST

## 2022-12-13 NOTE — PROGRESS NOTES
Physical Therapy Daily Treatment Note    [facility]  [address] [place] [address]  [phone] (phone)  [phone] (fax)    Patient: Moriah Morris   : 1955  Diagnosis/ICD-10 Code:  Acute pain of right knee [M25.561]  Referring practitioner: EMERITA Mg  Date of Initial Visit: Type: THERAPY  Noted: 2022  Today's Date: 2022  Patient seen for 6 sessions           Subjective   Patient reports that she is really enjoying water aerobis and is avoiding   Objective          Palpation   Left   Tenderness of the posterior tibialis.     Right Tenderness of the posterior tibialis.     Tenderness     Right Knee   No tenderness in the pes anserinus.         See Exercise, Manual, and Modality Logs for complete treatment.     Assessment/Plan  Pt presents with improving tolerance to ADLs/water aerobics and is no longer tender along R pes anserinus/MJL so held US this date though with continued prox post tib tenderness.  Added isometrics for pain control/strengthening with good tolerance.  Pt was issued updated HEP printout to facilitate compliance and recall with ex progressions today. Pt is progressing well with HEP and making steady progress toward goals     Progress per Plan of Care  Taper appts to once per week  Consider TDN to post tib muscles if symptoms persist.     Timed:    Manual Therapy:    10     mins  82979;  Therapeutic Exercise:    25     mins  17848; (decreased units billed to account for divided time)    Neuromuscular Pearl:        mins  21625;    Therapeutic Activity:          mins  18474;     Gait Training:           mins  81617;     Ultrasound:          mins  53862;   Electrical Stimulation:         mins  44990 ( );  Iontophoresis         mins 88968;  Aquatic Therapy         mins 52727;    Timed Treatment:   35   mins   Total Treatment:     50   mins    Keya Stuart PT  Physical Therapist    [place] License:[id number]

## 2022-12-14 ENCOUNTER — PATIENT ROUNDING (BHMG ONLY) (OUTPATIENT)
Dept: FAMILY MEDICINE CLINIC | Facility: CLINIC | Age: 67
End: 2022-12-14

## 2022-12-15 NOTE — PROGRESS NOTES
[facility] [place]  [address] [address]                                   Physical Therapy Reassessment    Patient: Moriah Morris   : 1955  Diagnosis/ICD-10 Code:  Acute pain of right knee [M25.561]  Referring practitioner: EMERITA Mg  NPI: [id number]                                      Date of Initial Visit:  Type: THERAPY  Noted: 2022  Today's Date: 2022  Patient seen for 7 sessions         Visit Diagnoses:    ICD-10-CM ICD-9-CM   1. Acute pain of right knee  M25.561 719.46   2. Arthritis of left knee  M17.12 716.96   3. Impaired functional mobility and activity tolerance  Z74.09 V49.89         Subjective Questionnaire: WOMAC: 17%( - 17.7% at [facility])   Clinical Progress: improved  Home Program Compliance: Yes  Treatment has included: therapeutic exercise, manual therapy, therapeutic activity, ultrasound and cryotherapy      Subjective   Moriah Morris reports: continuing to enjoy indoor cycling and water aerobics at the [facility].  Not noticing pain this morning.  Keeps a pillow by her bed in case it gets sore at night.      Pain Rating (0-10): 0-3/10    Objective          Palpation     Right   No palpable tenderness to the posterior tibialis.     Tenderness     Right Knee   Tenderness in the medial joint line (mid MCL).     Active Range of Motion     Right Knee   Flexion: 140 degrees   Extension: 5 (medial knee pain) degrees with pain    Strength/Myotome Testing     Right Hip   Planes of Motion   Flexion: 5 (long lever arm)  Extension: 4-  Abduction: 4+    Right Ankle/Foot   Dorsiflexion: 5  Plantar flexion: 5  Inversion: 5  Eversion: 5    Tests     Right Knee   Negative valgus stress test at 30 degrees (sore).           Assessment & Plan     Assessment  Impairments: abnormal or restricted ROM, activity intolerance, impaired physical strength and pain with function      Pt presents with continued medial knee soreness with lack of terminal  knee extension (TKE) and decreased glut max strength.  Attempted TKE with TB but was painful so will hold though was able to tolerate light leg press activity.  Pt would benefit from a continued course of skilled PT services in order to address listed impairments and increase tolerance to normal daily activities.     Goals  Plan Goals: STG In 4 weeks  1. Pt to exhibit compliance/independence with HEP without exacerbation of symptoms. - MET  2. Pt to exhibit increased R knee extension AROM to 3 degrees without pain to ability to get off her bike. - UNMET  3. Pt will sit to stand without increased R knee pain - progressing  4. Pt is able to resume cycling for at least 15 min with acceptable symptoms - MET  5. Patient will be independent with education for symptom management, joint protection and strategies to minimize stress on affected tissues - MET    LTG In 6-8 weeks  1. Pt to exhibit >/= 4+/5 hip/core MMT to demonstrate improved strength to allow for improved tolerance to ambulation and stairclimbing. - progressing  2. WOMAC </= 13% to demonstrate improved functional tolerance to ADLs - UNMET  3. - sleep disturbance - progressing  4. Patient will demonstrate an independent HEP for core and knee strength and flexibility/ROM. - progressing     Recommendations: Continue as planned  Timeframe: 1 x week for 3-4 weeks  Prognosis to achieve goals: good      Timed:         Manual Therapy:    -     mins  62361;     Therapeutic Exercise:    30     mins  50553;     Neuromuscular Pearl:    -    mins  72347;    Therapeutic Activity:     10     mins  74729;     Gait Training:      -     mins  66236;     Ultrasound:     8     mins  03140;    Ionto                               -    mins   32050  Self Care                       -     mins   54082    Timed Treatment:   48   mins   Total Treatment:     48   mins          PT: Keya Stuart PT     [place] License:  [id number]    Electronically signed by Keya Stuart PT,  12/20/2022, 10:26am EST    Certification Period: 12/20/2022 thru 3/19/2023  I certify that the therapy services are furnished while this patient is under my care.  The services outlined above are required by this patient, and will be reviewed every 90 days.         Physician Signature:__________________________________________________    PHYSICIAN: Agustín Gtz APRN      DATE:     Please sign and return via fax to [phone]. Thank you, [facility]

## 2022-12-16 ENCOUNTER — HOSPITAL ENCOUNTER (OUTPATIENT)
Dept: BONE DENSITY | Facility: HOSPITAL | Age: 67
Discharge: HOME | End: 2022-12-16

## 2022-12-16 ENCOUNTER — HOSPITAL ENCOUNTER (OUTPATIENT)
Dept: MAMMOGRAPHY | Facility: HOSPITAL | Age: 67
Discharge: HOME | End: 2022-12-16

## 2022-12-16 DIAGNOSIS — Z12.31 ENCOUNTER FOR SCREENING MAMMOGRAM FOR MALIGNANT NEOPLASM OF BREAST: ICD-10-CM

## 2022-12-16 DIAGNOSIS — M85.80 OSTEOPENIA, UNSPECIFIED LOCATION: ICD-10-CM

## 2022-12-16 DIAGNOSIS — M85.88 OTHER SPECIFIED DISORDERS OF BONE DENSITY AND STRUCTURE, OTHER SITE: ICD-10-CM

## 2022-12-16 PROCEDURE — 77063 BREAST TOMOSYNTHESIS BI: CPT

## 2022-12-16 PROCEDURE — 77067 SCR MAMMO BI INCL CAD: CPT

## 2022-12-16 PROCEDURE — 77080 DXA BONE DENSITY AXIAL: CPT

## 2022-12-20 ENCOUNTER — TREATMENT (OUTPATIENT)
Dept: PHYSICAL THERAPY | Facility: CLINIC | Age: 67
End: 2022-12-20

## 2022-12-20 DIAGNOSIS — M17.12 ARTHRITIS OF LEFT KNEE: ICD-10-CM

## 2022-12-20 DIAGNOSIS — Z74.09 IMPAIRED FUNCTIONAL MOBILITY AND ACTIVITY TOLERANCE: ICD-10-CM

## 2022-12-20 DIAGNOSIS — M25.561 ACUTE PAIN OF RIGHT KNEE: Primary | ICD-10-CM

## 2022-12-20 PROCEDURE — 97110 THERAPEUTIC EXERCISES: CPT | Performed by: PHYSICAL THERAPIST

## 2022-12-20 PROCEDURE — 97530 THERAPEUTIC ACTIVITIES: CPT | Performed by: PHYSICAL THERAPIST

## 2023-02-08 RX ORDER — CELECOXIB 200 MG/1
CAPSULE ORAL
Qty: 30 CAPSULE | Refills: 2 | Status: SHIPPED | OUTPATIENT
Start: 2023-02-08 | End: 2023-03-28

## 2023-03-01 ENCOUNTER — DOCUMENTATION (OUTPATIENT)
Dept: PHYSICAL THERAPY | Facility: CLINIC | Age: 68
End: 2023-03-01
Payer: MEDICARE

## 2023-03-01 NOTE — PROGRESS NOTES
Discharge Summary  Discharge Summary from Physical Therapy Report    Patient Information  Moriah Morris  1955    Dates  PT visit: 11/21-12/20/2022  Number of Visits: 7     Discharge Status of Patient: See Reassessment dated 12/20/2022    Goals: Partially Met    Discharge Plan: Continue with current home exercise program as instructed    Comments pt called to cancel remaining appts as she was feeling better        Keya Stuart, PT  Physical Therapist

## 2023-03-14 ENCOUNTER — OFFICE VISIT (OUTPATIENT)
Dept: FAMILY MEDICINE CLINIC | Facility: CLINIC | Age: 68
End: 2023-03-14
Payer: MEDICARE

## 2023-03-14 VITALS
OXYGEN SATURATION: 99 % | TEMPERATURE: 97.8 F | HEART RATE: 59 BPM | BODY MASS INDEX: 30.96 KG/M2 | SYSTOLIC BLOOD PRESSURE: 120 MMHG | HEIGHT: 61 IN | DIASTOLIC BLOOD PRESSURE: 70 MMHG | WEIGHT: 164 LBS

## 2023-03-14 DIAGNOSIS — H35.3131 EARLY DRY STAGE NONEXUDATIVE AGE-RELATED MACULAR DEGENERATION OF BOTH EYES: ICD-10-CM

## 2023-03-14 DIAGNOSIS — Z00.00 MEDICARE ANNUAL WELLNESS VISIT, SUBSEQUENT: Primary | ICD-10-CM

## 2023-03-14 DIAGNOSIS — R73.03 PREDIABETES: ICD-10-CM

## 2023-03-14 DIAGNOSIS — E78.2 MIXED HYPERLIPIDEMIA: ICD-10-CM

## 2023-03-14 DIAGNOSIS — E55.9 VITAMIN D DEFICIENCY: ICD-10-CM

## 2023-03-14 PROCEDURE — 1170F FXNL STATUS ASSESSED: CPT | Performed by: NURSE PRACTITIONER

## 2023-03-14 PROCEDURE — 1160F RVW MEDS BY RX/DR IN RCRD: CPT | Performed by: NURSE PRACTITIONER

## 2023-03-14 PROCEDURE — G0439 PPPS, SUBSEQ VISIT: HCPCS | Performed by: NURSE PRACTITIONER

## 2023-03-14 PROCEDURE — 1159F MED LIST DOCD IN RCRD: CPT | Performed by: NURSE PRACTITIONER

## 2023-03-14 PROCEDURE — 99213 OFFICE O/P EST LOW 20 MIN: CPT | Performed by: NURSE PRACTITIONER

## 2023-03-14 PROCEDURE — 1126F AMNT PAIN NOTED NONE PRSNT: CPT | Performed by: NURSE PRACTITIONER

## 2023-03-14 NOTE — PROGRESS NOTES
The ABCs of the Annual Wellness Visit  Subsequent Medicare Wellness Visit      Chief Complaint   Patient presents with   • Medicare Wellness-subsequent   • Hyperlipidemia       Subjective    Moriah Morris is a 67 y.o. female who presents for a Subsequent Medicare Wellness Visit.    The following portions of the patient's history were reviewed and   updated as appropriate: allergies, current medications, past family history, past medical history, past social history, past surgical history and problem list.    Compared to one year ago, the patient feels her physical   health is the same.    Compared to one year ago, the patient feels her mental   health is the same.    Health Habits:  Dental Exam: Up to date  Eye Exam: Up to date.  History of mild macular degeneration.  Diet: Nothing specific except limiting sweets and decreasing carbs.    Exercise: 5 times/week.  Current exercise activities include: walking, swimming, and cycling  Pap:  Past due. ?2016  Mammogram:  Mammo Screening Digital Tomosynthesis Bilateral With CAD (12/16/2022 09:29)   Dexa:DEXA Bone Density Axial (12/16/2022 10:12)   Colonoscopy: 2015?  Normal.      Recent Hospitalizations:  She was not admitted to the hospital during the last year.       Current Medical Providers:  Patient Care Team:  Head, EMERITA Russell as PCP - General (Nurse Practitioner)    Outpatient Medications Prior to Visit   Medication Sig Dispense Refill   • celecoxib (CeleBREX) 200 MG capsule TAKE ONE CAPSULE BY MOUTH DAILY 30 capsule 2   • Cholecalciferol (Vitamin D) 50 MCG (2000 UT) capsule      • Multiple Vitamins-Minerals (Eye Vitamins) capsule 2 caps twice a day       No facility-administered medications prior to visit.       No opioid medication identified on active medication list. I have reviewed chart for other potential  high risk medication/s and harmful drug interactions in the elderly.          Aspirin is not on active medication list.  Aspirin use is not indicated  "based on review of current medical condition/s. Risk of harm outweighs potential benefits.  .    Patient Active Problem List   Diagnosis   • Knee pain   • Pes anserinus bursitis   • Arthritis of left knee   • Early dry stage nonexudative age-related macular degeneration of both eyes     Advance Care Planning  Advance Directive is not on file.  ACP discussion was held with the patient during this visit. Patient has an advance directive (not in EMR), copy requested.     Objective    Vitals:    03/14/23 0919   BP: 120/70   Pulse: 59   Temp: 97.8 °F (36.6 °C)   SpO2: 99%   Weight: 74.4 kg (164 lb)   Height: 154.9 cm (61\")   PainSc: 0-No pain     Estimated body mass index is 30.99 kg/m² as calculated from the following:    Height as of this encounter: 154.9 cm (61\").    Weight as of this encounter: 74.4 kg (164 lb).    BMI is >= 30 and <35. (Class 1 Obesity). The following options were offered after discussion;: weight loss educational material (shared in after visit summary) and exercise counseling/recommendations      Does the patient have evidence of cognitive impairment? No    Lab Results   Component Value Date    CHLPL 299 (H) 03/14/2023    TRIG 138 03/14/2023    HDL 75 (H) 03/14/2023     (H) 03/14/2023    VLDL 25 03/14/2023    HGBA1C 5.90 (H) 03/14/2023        HEALTH RISK ASSESSMENT    Smoking Status:  Social History     Tobacco Use   Smoking Status Never   Smokeless Tobacco Never     Alcohol Consumption:  Social History     Substance and Sexual Activity   Alcohol Use Not Currently    Comment: Used to drink occasionally- maybe one or 2 drinks per month     Fall Risk Screen:    STEADI Fall Risk Assessment was completed, and patient is at LOW risk for falls.Assessment completed on:3/14/2023    Depression Screening:  PHQ-2/PHQ-9 Depression Screening 3/14/2023   Little Interest or Pleasure in Doing Things 0-->not at all   Feeling Down, Depressed or Hopeless 0-->not at all   PHQ-9: Brief Depression Severity " Measure Score 0       Health Habits and Functional and Cognitive Screening:  Functional & Cognitive Status 3/14/2023   Do you have difficulty preparing food and eating? No   Do you have difficulty bathing yourself, getting dressed or grooming yourself? No   Do you have difficulty using the toilet? No   Do you have difficulty moving around from place to place? No   Do you have trouble with steps or getting out of a bed or a chair? No   Current Diet Unhealthy Diet   Dental Exam Up to date   Eye Exam Up to date   Exercise (times per week) 7 times per week   Current Exercises Include Aerobics;Stationary Bicycling/Spin Class;Swim Aerobics Class   Do you need help using the phone?  No   Are you deaf or do you have serious difficulty hearing?  No   Do you need help with transportation? No   Do you need help shopping? No   Do you need help preparing meals?  No   Do you need help with housework?  No   Do you need help with laundry? No   Do you need help taking your medications? No   Do you need help managing money? No   Do you ever drive or ride in a car without wearing a seat belt? No   Have you felt unusual stress, anger or loneliness in the last month? No   Who do you live with? Sibling   If you need help, do you have trouble finding someone available to you? No   Have you been bothered in the last four weeks by sexual problems? No   Do you have difficulty concentrating, remembering or making decisions? No       Age-appropriate Screening Schedule:  Refer to the list below for future screening recommendations based on patient's age, sex and/or medical conditions. Orders for these recommended tests are listed in the plan section. The patient has been provided with a written plan.    Health Maintenance   Topic Date Due   • HEPATITIS C SCREENING  Never done   • ZOSTER VACCINE (1 of 2) 03/17/2023 (Originally 6/14/2004)   • COVID-19 Vaccine (4 - Booster for Pfizer series) 04/05/2023 (Originally 12/20/2021)   • ANNUAL WELLNESS  "VISIT  03/14/2024   • LIPID PANEL  03/14/2024   • TDAP/TD VACCINES (2 - Td or Tdap) 05/12/2024   • MAMMOGRAM  12/16/2024   • DXA SCAN  12/16/2024   • COLORECTAL CANCER SCREENING  12/14/2025   • PAP SMEAR  03/14/2028   • INFLUENZA VACCINE  Completed   • Pneumococcal Vaccine 65+  Completed                CMS Preventative Services Quick Reference  Risk Factors Identified During Encounter  Immunizations Discussed/Encouraged: Shingrix and COVID19  Dental Screening Recommended  Vision Screening Recommended  The above risks/problems have been discussed with the patient.  Pertinent information has been shared with the patient in the After Visit Summary.  An After Visit Summary and PPPS were made available to the patient.    Follow Up:   Next Medicare Wellness visit to be scheduled in 1 year.       Additional E&M Note during same encounter follows:  Patient has multiple medical problems which are significant and separately identifiable that require additional work above and beyond the Medicare Wellness Visit.      Chief Complaint  Medicare Wellness-subsequent and Hyperlipidemia    Subjective        HPI  Moriah Morris is also being seen today for continued management concerning hyperlipidemia.  Since she was last seen, she feels she has been doing well.  She is fasting today.  Will obtain routine annual physical labs and notify her of results.  Blood pressure stable at 120/70.  She is due for Pap however refusing at this time.  States no problems.  Has not had any abnormal Paps in the recent past.  Up-to-date on mammograms.         Objective   Vital Signs:  /70   Pulse 59   Temp 97.8 °F (36.6 °C)   Ht 154.9 cm (61\")   Wt 74.4 kg (164 lb)   SpO2 99%   BMI 30.99 kg/m²     Physical Exam  Constitutional:       Appearance: She is well-developed.   HENT:      Head: Normocephalic and atraumatic.      Right Ear: Tympanic membrane normal.      Left Ear: Tympanic membrane normal.   Eyes:      Pupils: Pupils are equal, " round, and reactive to light.   Cardiovascular:      Rate and Rhythm: Normal rate and regular rhythm.      Heart sounds: Normal heart sounds. No murmur heard.  Pulmonary:      Effort: Pulmonary effort is normal.      Breath sounds: Normal breath sounds. No wheezing, rhonchi or rales.   Abdominal:      General: Bowel sounds are normal.      Palpations: Abdomen is soft.      Tenderness: There is no abdominal tenderness.   Musculoskeletal:         General: No tenderness. Normal range of motion.      Cervical back: Normal range of motion and neck supple.   Skin:     General: Skin is warm and dry.      Findings: No erythema or rash.   Neurological:      Mental Status: She is alert and oriented to person, place, and time.   Psychiatric:         Behavior: Behavior normal.          The following data was reviewed by: EMERITA Gonzalez on 03/14/2023:  Common labs    Common Labs 11/11/22 3/14/23 3/14/23 3/14/23 3/14/23     0953 [id number] 0953 0953   Glucose 140 (A)  101 (A)     BUN 18  12     Creatinine 0.81  0.64     Sodium 138  141     Potassium 4.1  4.5     Chloride 102  101     Calcium 9.8  10.2     Total Protein   6.8     Albumin   4.6     Total Bilirubin   0.4     Alkaline Phosphatase   75     AST (SGOT)   27     ALT (SGPT)   26     WBC  6.21      Hemoglobin  14.6      Hematocrit  43.5      Platelets  280      Total Cholesterol     299 (A)   Triglycerides     138   HDL Cholesterol     75 (A)   LDL Cholesterol      199 (A)   Hemoglobin A1C    5.90 (A)    (A) Abnormal value       Comments are available for some flowsheets but are not being displayed.                      Assessment and Plan   Diagnoses and all orders for this visit:    1. Medicare annual wellness visit, subsequent (Primary)    2. Early dry stage nonexudative age-related macular degeneration of both eyes    3. Mixed hyperlipidemia    4. Prediabetes    5. Vitamin D deficiency             Follow Up   Return in about 1 year (around 3/14/2024) for Recheck on  arthritis, Medicare Wellness - same day fasting labs .  Patient was given instructions and counseling regarding her condition or for health maintenance advice. Please see specific information pulled into the AVS if appropriate.

## 2023-03-24 ENCOUNTER — TELEPHONE (OUTPATIENT)
Dept: FAMILY MEDICINE CLINIC | Facility: CLINIC | Age: 68
End: 2023-03-24

## 2023-03-24 NOTE — TELEPHONE ENCOUNTER
Michell please call and get more information . Which ones she is talking about and if they are coded wrong please correct.

## 2023-03-24 NOTE — TELEPHONE ENCOUNTER
Provider: ABDI LOPEZ    Caller: RUDDY THORNTON     Relationship to Patient: SELF    Phone Number: [phone]    Reason for Call: PATIENT WANTED TO INFORM THE OFFICE THAT SHE RECEIVED AN INVOICE FROM [facility] STATING SHE OWED OVER $300. PATIENT DID CONTACT [facility] AND GET IT STRAIGHTENED OUT, BUT PATIENT DIDN'T KNOW IF SHE WAS THE ONLY ONE WITH THE MISTAKE. PATIENT STATES THE LAB SAID THERE WAS NO INSURANCE INFORMATION PROVIDED WHEN THE ORDERS WERE SENT OVER.

## 2023-03-28 RX ORDER — CELECOXIB 100 MG/1
100 CAPSULE ORAL 2 TIMES DAILY
Qty: 60 CAPSULE | Refills: 2 | Status: SHIPPED | OUTPATIENT
Start: 2023-03-28

## 2023-04-12 ENCOUNTER — OFFICE VISIT (OUTPATIENT)
Dept: FAMILY MEDICINE CLINIC | Facility: CLINIC | Age: 68
End: 2023-04-12
Payer: MEDICARE

## 2023-04-12 VITALS
TEMPERATURE: 97.6 F | WEIGHT: 166 LBS | HEIGHT: 61 IN | BODY MASS INDEX: 31.34 KG/M2 | HEART RATE: 89 BPM | DIASTOLIC BLOOD PRESSURE: 82 MMHG | OXYGEN SATURATION: 96 % | RESPIRATION RATE: 16 BRPM | SYSTOLIC BLOOD PRESSURE: 120 MMHG

## 2023-04-12 DIAGNOSIS — J06.9 URI, ACUTE: Primary | ICD-10-CM

## 2023-04-12 PROBLEM — Z96.1 ARTIFICIAL LENS PRESENT: Status: ACTIVE | Noted: 2019-10-30

## 2023-04-12 PROBLEM — H35.3130 BILATERAL NONEXUDATIVE AGE-RELATED MACULAR DEGENERATION: Status: ACTIVE | Noted: 2019-08-26

## 2023-04-12 PROCEDURE — 99213 OFFICE O/P EST LOW 20 MIN: CPT | Performed by: PHYSICIAN ASSISTANT

## 2023-04-12 RX ORDER — DEXTROMETHORPHAN HYDROBROMIDE AND PROMETHAZINE HYDROCHLORIDE 15; 6.25 MG/5ML; MG/5ML
5 SYRUP ORAL 4 TIMES DAILY PRN
Qty: 118 ML | Refills: 0 | Status: SHIPPED | OUTPATIENT
Start: 2023-04-12

## 2023-04-12 RX ORDER — AMOXICILLIN 875 MG/1
875 TABLET, COATED ORAL 2 TIMES DAILY
Qty: 20 TABLET | Refills: 0 | Status: SHIPPED | OUTPATIENT
Start: 2023-04-12 | End: 2023-04-22

## 2023-04-12 NOTE — PROGRESS NOTES
"Chief Complaint  Cough (Dry/ unable to sleep, hoarse , started taking mucinex dm yesterday ) and Sore Throat    Subjective          Moriah Morris presents to [facility]  History of Present Illness  Moirah is a 67 year old female who presents with sister, at office visit today.  States she has been having a hoarse voice, dry cough, and sore throat that started on April 7, 2023.  Her symptoms worsen and she proceeded through [place] urgent care on April 9, 2023.  States she had negative strep, COVID and flu test.  She was given Tessalon Perles for her cough which has not helped.  Has been using over-the-counter Mucinex without relief.  Sleep has been restless due to her cough.  Appetite has been slightly decreased as well.  Denied any current fevers, chills, ear pain,headache, sinus pressure, wheezing, shortness of air, nausea, vomiting, diarrhea or lost of taste or smell.  States she has had a slight runny nose to stuffy nose off and on.  She has been trying to increase fluid intake.     Objective   Vital Signs:   /82 (BP Location: Left arm, Patient Position: Sitting, Cuff Size: Adult)   Pulse 89   Temp 97.6 °F (36.4 °C)   Resp 16   Ht 154.9 cm (61\")   Wt 75.3 kg (166 lb)   SpO2 96%   BMI 31.37 kg/m²     Physical Exam  Vitals and nursing note reviewed.   Constitutional:       Appearance: Normal appearance. She is well-developed and well-groomed. She is obese.      Interventions: Face mask in place.      Comments: Presents with hoarse voice  Sister is in exam room wearing facial mask.   HENT:      Head: Normocephalic and atraumatic.      Jaw: There is normal jaw occlusion.      Right Ear: Hearing, ear canal and external ear normal. Tympanic membrane is bulging.      Left Ear: Hearing, ear canal and external ear normal. Tympanic membrane is bulging.      Nose: Congestion present.      Right Sinus: No maxillary sinus tenderness or frontal sinus tenderness.      Left Sinus: No " maxillary sinus tenderness or frontal sinus tenderness.      Mouth/Throat:      Lips: Pink.      Mouth: Mucous membranes are moist.      Dentition: Normal dentition.      Tongue: No lesions.      Pharynx: Oropharynx is clear. Uvula midline.      Tonsils: No tonsillar exudate.   Eyes:      General: Lids are normal.      Conjunctiva/sclera: Conjunctivae normal.      Pupils: Pupils are equal, round, and reactive to light.   Neck:      Vascular: No carotid bruit.      Trachea: Trachea and phonation normal. No tracheal tenderness.   Cardiovascular:      Rate and Rhythm: Normal rate and regular rhythm.      Pulses: Normal pulses.      Heart sounds: Normal heart sounds, S1 normal and S2 normal. No murmur heard.  Pulmonary:      Effort: Pulmonary effort is normal.      Breath sounds: Normal breath sounds and air entry.   Abdominal:      General: Bowel sounds are normal.      Palpations: Abdomen is soft.      Tenderness: There is no abdominal tenderness. There is no right CVA tenderness, left CVA tenderness, guarding or rebound. Negative signs include Jaimes's sign, Rovsing's sign, McBurney's sign, psoas sign and obturator sign.   Musculoskeletal:      Cervical back: Neck supple.      Right lower leg: No edema.      Left lower leg: No edema.   Lymphadenopathy:      Cervical: No cervical adenopathy.      Right cervical: No superficial, deep or posterior cervical adenopathy.     Left cervical: No superficial, deep or posterior cervical adenopathy.   Skin:     General: Skin is warm and dry.      Capillary Refill: Capillary refill takes less than 2 seconds.   Neurological:      Mental Status: She is alert and oriented to person, place, and time.   Psychiatric:         Attention and Perception: Attention and perception normal.         Mood and Affect: Mood and affect normal.         Speech: Speech normal.         Behavior: Behavior is cooperative.         Thought Content: Thought content normal.         Cognition and Memory:  Cognition and memory normal.         Judgment: Judgment normal.        Result Review :                 Assessment and Plan    Diagnoses and all orders for this visit:    1. URI, acute (Primary)  -     promethazine-dextromethorphan (PROMETHAZINE-DM) 6.25-15 MG/5ML syrup; Take 5 mL by mouth 4 (Four) Times a Day As Needed for Cough.  Dispense: 118 mL; Refill: 0  -     amoxicillin (AMOXIL) 875 MG tablet; Take 1 tablet by mouth 2 (Two) Times a Day for 10 days.  Dispense: 20 tablet; Refill: 0    Moriah was seen in office today and diagnosed with upper respiratory infection: Have reviewed her urgent care notes from [place] [facility] on April 9, 2023.  I have sent amoxicillin and promethazine/dextromethorphan cough syrup to pharmacy.  She will use as directed.  Increase fluid intake.  She will notify office if symptoms do not improve.  I will consider imaging at that time.  Moriah voiced understanding.    I spent 18 minutes caring for Moriah on this date of service. This time includes time spent by me in the following activities:preparing for the visit, obtaining and/or reviewing a separately obtained history, performing a medically appropriate examination and/or evaluation , counseling and educating the patient/family/caregiver, ordering medications, tests, or procedures and documenting information in the medical record     Follow Up   Return if symptoms worsen or fail to improve.  Patient was given instructions and counseling regarding her condition or for health maintenance advice. Please see specific information pulled into the AVS if appropriate.     [facility]  [address] [place] [id number]  Dept: [phone]  Dept Fax: [phone]  Loc: [phone]  Loc Fax: [phone]

## 2023-04-13 ENCOUNTER — TELEPHONE (OUTPATIENT)
Dept: FAMILY MEDICINE CLINIC | Facility: CLINIC | Age: 68
End: 2023-04-13

## 2023-04-13 NOTE — TELEPHONE ENCOUNTER
Caller: Moriah Morris    Relationship: Self    Best call back number: [phone]    What was the call regarding: PATIENT WOULD LIKE TO SPEAK WITH ROULA BAI.    SHE STATED SHE IS STILL HAVING SOME ISSUES.    PLEASE CALL.    Do you require a callback: YES

## 2023-04-13 NOTE — TELEPHONE ENCOUNTER
Pt states after taking the cough medication last night she still was coughing through out the night, I advised her to give it more time and call back Monday if no improvement.

## 2023-10-03 RX ORDER — CELECOXIB 100 MG/1
CAPSULE ORAL
Qty: 60 CAPSULE | Refills: 2 | Status: SHIPPED | OUTPATIENT
Start: 2023-10-03

## 2024-01-09 RX ORDER — CELECOXIB 100 MG/1
100 CAPSULE ORAL 2 TIMES DAILY
Qty: 60 CAPSULE | Refills: 2 | Status: SHIPPED | OUTPATIENT
Start: 2024-01-09

## 2024-01-26 ENCOUNTER — OFFICE VISIT (OUTPATIENT)
Dept: INTERNAL MEDICINE | Facility: CLINIC | Age: 69
End: 2024-01-26
Payer: MEDICARE

## 2024-01-26 VITALS
WEIGHT: 166.8 LBS | DIASTOLIC BLOOD PRESSURE: 80 MMHG | SYSTOLIC BLOOD PRESSURE: 130 MMHG | OXYGEN SATURATION: 99 % | TEMPERATURE: 97.8 F | HEART RATE: 55 BPM | BODY MASS INDEX: 31.52 KG/M2

## 2024-01-26 DIAGNOSIS — E55.9 VITAMIN D DEFICIENCY: ICD-10-CM

## 2024-01-26 DIAGNOSIS — E78.5 HYPERLIPIDEMIA, UNSPECIFIED HYPERLIPIDEMIA TYPE: ICD-10-CM

## 2024-01-26 DIAGNOSIS — M17.12 ARTHRITIS OF LEFT KNEE: ICD-10-CM

## 2024-01-26 DIAGNOSIS — R73.03 PREDIABETES: Primary | ICD-10-CM

## 2024-01-26 PROCEDURE — 1159F MED LIST DOCD IN RCRD: CPT | Performed by: NURSE PRACTITIONER

## 2024-01-26 PROCEDURE — 1160F RVW MEDS BY RX/DR IN RCRD: CPT | Performed by: NURSE PRACTITIONER

## 2024-01-26 PROCEDURE — 99213 OFFICE O/P EST LOW 20 MIN: CPT | Performed by: NURSE PRACTITIONER

## 2024-01-26 NOTE — PROGRESS NOTES
"Chief Complaint  Establish Care    Subjective        Moriah Morris presents to [facility]  History of Present Illness  Here to establish care and to discuss weight.     She was previously seen Dr. Shakira Mays, her primary care provider.  Last office visit was August 2, 2023.    Last Medicare annual wellness visit was on 03/14/2023 with EMERITA Ramos.    Obesity:  She is very physicaly active, used to run, spin class in the water, used to play tennis, but her knees do not allow her play. She is an avid cyclist. She lives with her older sister, eats take out a lot, imbibes a lot of calories.     Hyperlipidemia: her LDL is usually high, last value on August 2, 2023 = 119.  She has been recommended by past providers to start a statin, but she does not like to take pills and is not interested in starting a statin therapy.    Prediabetes:  her hemoglobin A1c from 08/02/2023 = 5.9.     Knee arthritis: She takes celecoxib (CELEBREX) 100 mg BID for arthritis pain, responds well. No GI side effects. She has history of vitamin D deficiency, takes daily supplement. Last vitamin D lab results in EHR is from March 18, 2019 = 54.8. She has seen EMERITA Mg, with [facility] orth[facility].  Last office visit was November 10, 2022 where she received steroid injection (80 mg methylprednisolone) to the right knee.      Objective   Vital Signs:  /80 (BP Location: Left arm, Patient Position: Sitting, Cuff Size: Adult)   Pulse 55   Temp 97.8 °F (36.6 °C) (Infrared)   Wt 75.7 kg (166 lb 12.8 oz)   SpO2 99%   BMI 31.52 kg/m²   Estimated body mass index is 31.52 kg/m² as calculated from the following:    Height as of 4/12/23: 154.9 cm (61\").    Weight as of this encounter: 75.7 kg (166 lb 12.8 oz).                 Physical Exam  Vitals reviewed.   Constitutional:       General: She is not in acute distress.     Appearance: Normal appearance. She is obese. She is not ill-appearing, " toxic-appearing or diaphoretic.   Cardiovascular:      Rate and Rhythm: Normal rate and regular rhythm.      Pulses: Normal pulses.      Heart sounds: Normal heart sounds.   Pulmonary:      Effort: Pulmonary effort is normal.      Breath sounds: Normal breath sounds.   Neurological:      General: No focal deficit present.      Mental Status: She is alert and oriented to person, place, and time. Mental status is at baseline.      Motor: No weakness.      Gait: Gait normal.   Psychiatric:         Mood and Affect: Mood normal.         Behavior: Behavior normal.         Thought Content: Thought content normal.         Judgment: Judgment normal.        Result Review :                        Assessment and Plan   Patient is a very pleasant 68-year-old female with early dry stage non-exudative age-related macular degeneration of both eyes, artificial lens, knee pain bursitis, obesity, family history of liver cancer (she had a sister who  from liver cancer) here to establish care.              Diagnoses and all orders for this visit:    1. Prediabetes (Primary)  -     Hemoglobin A1c; Future  -     Thyroid Panel With TSH; Future  -     Urinalysis With Microscopic - Urine, Clean Catch; Future    2. Hyperlipidemia, unspecified hyperlipidemia type  -     CBC & Differential; Future  -     Comprehensive Metabolic Panel; Future  -     Lipid Panel; Future    3. Vitamin D deficiency  -     Vitamin D,25-Hydroxy; Future    4. Arthritis of left knee  Continue current medication.  She does not need a refill at this time.    5. BMI 31.0-31.9,adult  Discussed keeping a food diary to monitor daily kcal intake.  She is very physically active, and encouraged to keep up her physical activity.    Advised to have lab work completed 1 to 2 weeks prior to next annual wellness visit.         Follow Up   Return in about 2 months (around 3/26/2024) for Annual physical.  Patient was given instructions and counseling regarding her condition or for  health maintenance advice. Please see specific information pulled into the AVS if appropriate.

## 2024-02-12 DIAGNOSIS — M17.12 ARTHRITIS OF LEFT KNEE: Primary | ICD-10-CM

## 2024-02-12 RX ORDER — CELECOXIB 200 MG/1
200 CAPSULE ORAL 2 TIMES DAILY
Qty: 60 CAPSULE | Refills: 1 | Status: SHIPPED | OUTPATIENT
Start: 2024-02-12

## 2024-02-15 DIAGNOSIS — U07.1 COVID-19 VIRUS INFECTION: Primary | ICD-10-CM

## 2024-03-18 ENCOUNTER — OFFICE VISIT (OUTPATIENT)
Dept: INTERNAL MEDICINE | Facility: CLINIC | Age: 69
End: 2024-03-18
Payer: MEDICARE

## 2024-03-18 VITALS
DIASTOLIC BLOOD PRESSURE: 80 MMHG | SYSTOLIC BLOOD PRESSURE: 124 MMHG | TEMPERATURE: 97.7 F | OXYGEN SATURATION: 98 % | WEIGHT: 163.9 LBS | BODY MASS INDEX: 30.94 KG/M2 | HEIGHT: 61 IN | HEART RATE: 73 BPM | RESPIRATION RATE: 16 BRPM

## 2024-03-18 DIAGNOSIS — E78.5 HYPERLIPIDEMIA, UNSPECIFIED HYPERLIPIDEMIA TYPE: ICD-10-CM

## 2024-03-18 DIAGNOSIS — R73.03 PREDIABETES: ICD-10-CM

## 2024-03-18 DIAGNOSIS — M17.12 ARTHRITIS OF LEFT KNEE: ICD-10-CM

## 2024-03-18 DIAGNOSIS — E66.9 OBESITY (BMI 30-39.9): ICD-10-CM

## 2024-03-18 DIAGNOSIS — Z00.00 MEDICARE ANNUAL WELLNESS VISIT, SUBSEQUENT: Primary | ICD-10-CM

## 2024-03-18 NOTE — PROGRESS NOTES
The ABCs of the Annual Wellness Visit  Subsequent Medicare Wellness Visit    Subjective    Moriah Morris is a 68 y.o. female who presents for a Subsequent Medicare Wellness Visit.    The following portions of the patient's history were reviewed and   updated as appropriate: allergies, current medications, past family history, past medical history, past social history, past surgical history, and problem list.    Compared to one year ago, the patient feels her physical   health is better.    Compared to one year ago, the patient feels her mental   health is the same.    Recent Hospitalizations:  She was not admitted to the hospital during the last year.       Current Medical Providers:  Patient Care Team:  Delisa Cole APRN as PCP - General (Internal Medicine)    Outpatient Medications Prior to Visit   Medication Sig Dispense Refill    celecoxib (CeleBREX) 200 MG capsule Take 1 capsule by mouth 2 (Two) Times a Day. 60 capsule 1    Cholecalciferol (Vitamin D) 50 MCG (2000 UT) capsule       Multiple Vitamins-Minerals (Eye Vitamins) capsule 2 caps twice a day       No facility-administered medications prior to visit.       No opioid medication identified on active medication list. I have reviewed chart for other potential  high risk medication/s and harmful drug interactions in the elderly.        Aspirin is not on active medication list.  Aspirin use is not indicated based on review of current medical condition/s. Risk of harm outweighs potential benefits.  .    Patient Active Problem List   Diagnosis    Knee pain    Pes anserinus bursitis    Arthritis of left knee    Early dry stage nonexudative age-related macular degeneration of both eyes    Bilateral nonexudative age-related macular degeneration    Artificial lens present     Advance Care Planning   Advance Care Planning     Advance Directive is not on file.  ACP discussion was held with the patient during this visit. Patient has an advance directive (not in  "EMR), copy requested.     Objective    Vitals:    03/18/24 1511   BP: 124/80   Pulse: 73   Resp: 16   Temp: 97.7 °F (36.5 °C)   SpO2: 98%   Weight: 74.3 kg (163 lb 14.4 oz)   Height: 154.9 cm (61\")     Estimated body mass index is 30.97 kg/m² as calculated from the following:    Height as of this encounter: 154.9 cm (61\").    Weight as of this encounter: 74.3 kg (163 lb 14.4 oz).    BMI is >= 30 and <35. (Class 1 Obesity). The following options were offered after discussion;: exercise counseling/recommendations and nutrition counseling/recommendations      Does the patient have evidence of cognitive impairment? No    Lab Results   Component Value Date    CHLPL 246 (H) 03/08/2024    TRIG 107 03/08/2024    HDL 69 03/08/2024     (H) 03/08/2024    VLDL 19 03/08/2024    HGBA1C 6.0 (H) 03/08/2024        HEALTH RISK ASSESSMENT    Smoking Status:  Social History     Tobacco Use   Smoking Status Never   Smokeless Tobacco Never     Alcohol Consumption:  Social History     Substance and Sexual Activity   Alcohol Use Yes    Comment: Used to drink occasionally- maybe one or 2 drinks per month     Fall Risk Screen:    MARCUS Fall Risk Assessment was completed, and patient is at LOW risk for falls.Assessment completed on:1/26/2024    Depression Screening:      3/18/2024     3:00 PM   PHQ-2/PHQ-9 Depression Screening   Little Interest or Pleasure in Doing Things 0-->not at all   Feeling Down, Depressed or Hopeless 0-->not at all   PHQ-9: Brief Depression Severity Measure Score 0       Health Habits and Functional and Cognitive Screening:      3/11/2024     7:35 AM   Functional & Cognitive Status   Do you have difficulty preparing food and eating? No   Do you have difficulty bathing yourself, getting dressed or grooming yourself? No   Do you have difficulty using the toilet? No   Do you have difficulty moving around from place to place? No   Do you have trouble with steps or getting out of a bed or a chair? No   Current Diet " Limited Junk Food   Dental Exam Up to date   Eye Exam Up to date   Exercise (times per week) 5 times per week   Current Exercises Include Bicycling Outdoors;Cardiovascular Workout;Light Weights   Do you need help using the phone?  No   Are you deaf or do you have serious difficulty hearing?  No   Do you need help to go to places out of walking distance? No   Do you need help shopping? No   Do you need help preparing meals?  No   Do you need help with housework?  No   Do you need help with laundry? No   Do you need help taking your medications? No   Do you need help managing money? No   Do you ever drive or ride in a car without wearing a seat belt? No   Have you felt unusual stress, anger or loneliness in the last month? No   Who do you live with? Sibling   If you need help, do you have trouble finding someone available to you? No   Have you been bothered in the last four weeks by sexual problems? No   Do you have difficulty concentrating, remembering or making decisions? No       Age-appropriate Screening Schedule:  Refer to the list below for future screening recommendations based on patient's age, sex and/or medical conditions. Orders for these recommended tests are listed in the plan section. The patient has been provided with a written plan.    Health Maintenance   Topic Date Due    ZOSTER VACCINE (1 of 2) 03/18/2024 (Originally 6/14/2004)    COVID-19 Vaccine (4 - 2023-24 season) 03/20/2024 (Originally 9/1/2023)    RSV Vaccine - Adults (1 - 1-dose 60+ series) 03/18/2025 (Originally 6/14/2014)    TDAP/TD VACCINES (2 - Td or Tdap) 05/12/2024    MAMMOGRAM  12/16/2024    DXA SCAN  12/16/2024    LIPID PANEL  03/08/2025    ANNUAL WELLNESS VISIT  03/18/2025    BMI FOLLOWUP  03/18/2025    COLORECTAL CANCER SCREENING  12/14/2025    PAP SMEAR  03/14/2028    HEPATITIS C SCREENING  Completed    INFLUENZA VACCINE  Completed    Pneumococcal Vaccine 65+  Completed                  CMS Preventative Services Quick  "Reference  Risk Factors Identified During Encounter  None Identified  The above risks/problems have been discussed with the patient.  Pertinent information has been shared with the patient in the After Visit Summary.  An After Visit Summary and PPPS were made available to the patient.    Follow Up:   Next Medicare Wellness visit to be scheduled in 1 year.       Additional E&M Note during same encounter follows:  Patient has multiple medical problems which are significant and separately identifiable that require additional work above and beyond the Medicare Wellness Visit.      Chief Complaint  Annual Exam (Medicare Wellness Exam)    Subjective        Labs from 03/08/2024 include the following:      HbA1c = 6.0%  Total cholesterol = 246, LDL cholesterol = 158  Urinalysis with microscopy unremarkable side from 2+ leukocytes most likely contamination.           Moriah Morris is also being seen today for hyperlipidemia and obesity      She was last seen in our office on January 26, 2024 to establish care and discuss weight.    Hyperlipidemia: She has been recommended to start statin therapy, but at last office visit she stated that she does not like to take pills.     Obesity: She states that she is physically active, and is an avid cyclist.  She lives with her older sister, and they do not cook for themselves very often.  They get take away quite often and imbibe quite a lot of calories.    She lost 2 lbs, has improved her diet. She is still an avid cyclist. She is determined to get her hemoglobin A1c down. She is trying to lose about 1 lb per week or every 2 weeks.     Knee pain: her celecoxib (CELEBREX) was increased. She feels she does  not need to take a higher dose when she is consistently.     Review of Systems   All other systems reviewed and are negative.      Objective   Vital Signs:  /80   Pulse 73   Temp 97.7 °F (36.5 °C)   Resp 16   Ht 154.9 cm (61\")   Wt 74.3 kg (163 lb 14.4 oz)   SpO2 98%   " BMI 30.97 kg/m²     Physical Exam  Vitals reviewed.   Constitutional:       General: She is not in acute distress.     Appearance: Normal appearance. She is obese. She is not ill-appearing, toxic-appearing or diaphoretic.   HENT:      Head: Normocephalic and atraumatic.   Eyes:      Extraocular Movements: Extraocular movements intact.      Conjunctiva/sclera: Conjunctivae normal.      Pupils: Pupils are equal, round, and reactive to light.   Cardiovascular:      Rate and Rhythm: Normal rate and regular rhythm.      Pulses: Normal pulses.      Heart sounds: Normal heart sounds.   Pulmonary:      Effort: Pulmonary effort is normal.      Breath sounds: Normal breath sounds.   Abdominal:      General: Bowel sounds are normal.      Palpations: Abdomen is soft.      Tenderness: There is no abdominal tenderness.   Musculoskeletal:         General: Normal range of motion.      Cervical back: Normal range of motion and neck supple.      Right lower leg: No edema.      Left lower leg: No edema.   Skin:     General: Skin is warm and dry.      Capillary Refill: Capillary refill takes less than 2 seconds.   Neurological:      General: No focal deficit present.      Mental Status: She is alert and oriented to person, place, and time. Mental status is at baseline.      Motor: No weakness.      Coordination: Coordination normal.      Gait: Gait normal.      Deep Tendon Reflexes: Reflexes normal.   Psychiatric:         Mood and Affect: Mood normal.         Behavior: Behavior normal.         Thought Content: Thought content normal.         Judgment: Judgment normal.                         Assessment and Plan   Diagnoses and all orders for this visit:    1. Medicare annual wellness visit, subsequent (Primary)  Labs reviewed with patient.   2. Hyperlipidemia, unspecified hyperlipidemia type  Improving, declines statin or ezetimibe(ZETIA), but did review ezetimibe pharmacology. She wishes to wait 6 months after losing weight and making  other lifestyle changes before deciding whether or not she wants to start dyslipidemic therapy.     3. Arthritis of left knee  Okay to back down celecoxib (CELEBREX) dose as needed.   Recommend trying topical NSAIDS and moist heat her she states she does not have a lot of pain if she keeps moving.   4. Prediabetes  ChdE3a=7.0%, up from 5.9% 7 months ago. She is determined to decrease it through lifestyle and losing weight.   We can recheck in 6 months.   5. Obesity (BMI 30-39.9)  Improving. According to our scale, she has lost 3 lbs.   Encouraged her to continue with good lifestyle choices.  We can re-evaluate her progress in 6 months.            Follow Up   Return in about 6 months (around 9/18/2024) for Recheck.  Patient was given instructions and counseling regarding her condition or for health maintenance advice. Please see specific information pulled into the AVS if appropriate.

## 2024-04-09 DIAGNOSIS — M17.12 ARTHRITIS OF LEFT KNEE: ICD-10-CM

## 2024-04-09 RX ORDER — CELECOXIB 200 MG/1
200 CAPSULE ORAL 2 TIMES DAILY
Qty: 60 CAPSULE | Refills: 1 | Status: SHIPPED | OUTPATIENT
Start: 2024-04-09

## 2024-04-09 NOTE — TELEPHONE ENCOUNTER
Rx Refill Note  Requested Prescriptions     Pending Prescriptions Disp Refills    celecoxib (CeleBREX) 200 MG capsule [Pharmacy Med Name: CELECOXIB 200 MG CAPSULE] 60 capsule 1     Sig: TAKE 1 CAPSULE BY MOUTH TWICE A DAY      Last office visit with prescribing clinician: 3/18/2024   Last telemedicine visit with prescribing clinician: Visit date not found   Next office visit with prescribing clinician: Visit date not found

## 2024-06-06 DIAGNOSIS — M17.12 ARTHRITIS OF LEFT KNEE: ICD-10-CM

## 2024-06-06 RX ORDER — CELECOXIB 200 MG/1
200 CAPSULE ORAL 2 TIMES DAILY
Qty: 60 CAPSULE | Refills: 1 | Status: SHIPPED | OUTPATIENT
Start: 2024-06-06

## 2024-09-03 ENCOUNTER — OFFICE VISIT (OUTPATIENT)
Dept: ORTHOPEDIC SURGERY | Facility: CLINIC | Age: 69
End: 2024-09-03
Payer: MEDICARE

## 2024-09-03 VITALS — TEMPERATURE: 96.8 F | BODY MASS INDEX: 32.39 KG/M2 | WEIGHT: 165 LBS | HEIGHT: 60 IN

## 2024-09-03 DIAGNOSIS — M25.561 PAIN IN BOTH KNEES, UNSPECIFIED CHRONICITY: ICD-10-CM

## 2024-09-03 DIAGNOSIS — M17.12 PRIMARY OSTEOARTHRITIS OF LEFT KNEE: Primary | ICD-10-CM

## 2024-09-03 DIAGNOSIS — M17.0 PRIMARY OSTEOARTHRITIS OF KNEES, BILATERAL: ICD-10-CM

## 2024-09-03 DIAGNOSIS — M25.562 PAIN IN BOTH KNEES, UNSPECIFIED CHRONICITY: ICD-10-CM

## 2024-09-03 PROCEDURE — 99214 OFFICE O/P EST MOD 30 MIN: CPT | Performed by: NURSE PRACTITIONER

## 2024-09-03 PROCEDURE — 73562 X-RAY EXAM OF KNEE 3: CPT | Performed by: NURSE PRACTITIONER

## 2024-09-03 RX ORDER — CHLORHEXIDINE GLUCONATE 500 MG/1
CLOTH TOPICAL ONCE
OUTPATIENT
Start: 2024-09-03

## 2024-09-03 RX ORDER — PREGABALIN 150 MG/1
150 CAPSULE ORAL ONCE
OUTPATIENT
Start: 2024-09-03 | End: 2024-09-03

## 2024-09-03 RX ORDER — MELOXICAM 7.5 MG/1
15 TABLET ORAL ONCE
OUTPATIENT
Start: 2024-09-03 | End: 2024-09-03

## 2024-09-03 NOTE — PROGRESS NOTES
Patient: Moriah Morris  YOB: 1955 69 y.o. female  Medical Record Number: [id number]    Chief Complaints:   Chief Complaint   Patient presents with    Right Knee - Follow-up, Pain    Left Knee - Follow-up, Pain       History of Present Illness:Moriah Morris is a 69 y.o. female who presents for follow-up of bilateral knee pain, left greater than right, that is chronic in nature.  Patient has known advanced bilateral knee osteoarthritis that we have been conservatively treating.  Patient reports as of recent her symptoms are progressively worsening.  She states that her pain is constant on a daily basis and rates it as a 7 on a scale of 10.  Worse with certain positions or movements, prolonged walking, prolonged standing, going up and down stairs.  Patient gets relief by taking Celebrex and taking short periods of rest.  She states the left knee is starting to buckle and give way.  She states that she is active and wants to get back to playing tennis but her knees would not allow her to do so.  She states that she has been cycling in the interim and now this is getting harder to do due to the pain in the knees.  She states that this discomfort and pain in her knees is greatly affecting her quality of life and she is ready to proceed forward with surgical intervention.    Allergies: No Known Allergies    Medications:   Current Outpatient Medications   Medication Sig Dispense Refill    celecoxib (CeleBREX) 200 MG capsule TAKE 1 CAPSULE BY MOUTH TWICE A DAY 60 capsule 1    Cholecalciferol (Vitamin D) 50 MCG (2000 UT) capsule       Multiple Vitamins-Minerals (Eye Vitamins) capsule 2 caps twice a day       No current facility-administered medications for this visit.         The following portions of the patient's history were reviewed and updated as appropriate: allergies, current medications, past family history, past medical history, past social history, past surgical history and problem list.    Review  "of Systems:   Pertinent positives/negative listed in HPI above    Physical Exam:   Vitals:    09/03/24 1346   Temp: 96.8 °F (36 °C)   TempSrc: Temporal   Weight: 74.8 kg (165 lb)   Height: 152.4 cm (60\")   PainSc:   7   PainLoc: Knee       General: A and O x 3, ASA, NAD      Knee Exam List: Knee:  bilateral    ALIGNMENT:     Varus  ,   Patella  tracks  midline    GAIT:    Antalgic    SKIN:    No abnormality    RANGE OF MOTION:   0  -  118   DEG    STRENGTH:   4  / 5    LIGAMENTS:    No varus / valgus instability.   Negative  Lachman.    MENISCUS:     Negative   Nyla       DISTAL PULSES:    Paplable    DISTAL SENSATION :   Intact    LYMPHATICS:     No   lymphadenopathy    OTHER:          - Positive   effusion      - Crepitance with ROM     Radiology:  Xrays 3views bilateral knees (ap,lateral, sunrise) were ordered and reviewed for evaluation of knee pain demonstratingadvanced varus osteoarthritis with bone on bone articulation, subchondral cysts, and periarticular osteophytes.  todays xrays were compared to previous xrays and demonstrate some progression of the disease process.    Assessment/Plan: Primary osteoarthritis bilateral knees, left greater than right    Knee Plan List: Continuation of conservative management vs. TKA discussed.  The patient wishes to proceed with total knee replacement.  At this point the patient has failed the full compliment of conservative treatment and stating complete understanding of the risks/benefits/ anternatives wishes to proceed with surgical treatment.    Risk and benefits of surgery were reviewed.  Including, but not limited to, blood clots or pulmonary embolism, anesthesia risk, infection, fracture, skin/leg numbness, persistent pain/crepitance/popping/catching, failure of the implant, need for future surgeries, hematoma, possible nerve or blood vessel injury, need for transfusion, and potential risk of stroke,heart attack or death, among others.  The patient understands " and wishes to proceed.     It was explained that if tissue has been repaired or reconstructed, there is also an increased chance of failure which may require further management.  Following the completion of the discussion, the patient expressed understanding of this planned course of care, all their questions were answered and consent will be obtained preoperatively.    Operative Plan: [facility] Total Knee Replacement performing the procedure on an outpatient basis with home health rehab.  Patient does not have any significant medical history does not require medical clearances.  She has a current BMI of 32.22 kg/m².  We are going to send the patient back to physical therapy for a prehab visit.  We will start with the left knee first and then proceed forward with the right once she is ready.        Agustín Gtz, APRN  9/3/2024    This patient was seen in conjunction today with Dr. Jason Minaya.  Dr. Minaya agrees with the above-stated assessment and plan.

## 2024-09-04 PROBLEM — M17.12 PRIMARY OSTEOARTHRITIS OF LEFT KNEE: Status: ACTIVE | Noted: 2024-09-03

## 2024-09-10 ENCOUNTER — TREATMENT (OUTPATIENT)
Dept: PHYSICAL THERAPY | Facility: CLINIC | Age: 69
End: 2024-09-10
Payer: MEDICARE

## 2024-09-10 DIAGNOSIS — M25.562 LEFT KNEE PAIN, UNSPECIFIED CHRONICITY: Primary | ICD-10-CM

## 2024-09-10 DIAGNOSIS — R26.2 DIFFICULTY WALKING DUE TO KNEE JOINT: ICD-10-CM

## 2024-09-10 DIAGNOSIS — M17.12 PRIMARY OSTEOARTHRITIS OF LEFT KNEE: ICD-10-CM

## 2024-09-10 NOTE — PROGRESS NOTES
Physical Therapy Initial Evaluation and Plan of Care    Patient: Moriah Morris   : 1955  Diagnosis/ICD-10 Code:  Left knee pain, unspecified chronicity [M25.562]  Referring practitioner: EMERITA Mg  Date of Initial Visit: 9/10/2024  Today's Date: 2024  Patient seen for 1 session         Visit Diagnoses:    ICD-10-CM ICD-9-CM   1. Left knee pain, unspecified chronicity  M25.562 719.46   2. Primary osteoarthritis of left knee  M17.12 715.16   3. Difficulty walking due to knee joint  R26.2 719.7         Subjective Questionnaire: LEFS: 60/80      Subjective Evaluation    History of Present Illness  Onset date: increased in the last 6 months.  Mechanism of injury: Pt is a 68 y/o WF who reports to the clinic with left knee pain that has been progressively getting worse in the last 6 months.  Pt states she has been dealing with B knee pain for approximately 10 yrs. Pt has been managing the pain with injections.  Pt states her last injection in the left knee was gel and it only lasted 2 weeks. Pt gave up tennis and hiking due to the knee pain.  Pt is now cycling, but it is now becoming more difficult due to the pain.  Pt states the right knee is the most painful, but now the left knee is giving out so going to do the TKR on that knee first. Pt with Hx of a left knee arthroscopic knee surgery, left foot-Fx ORIF.  Pt denies having any prior knee injuries.        PMH: unremarkable- Pre Diabetic, but controlled by diet     Subjective comment: Pt is having sugery on the left knee 2024.  Plan to have the right knee done 90 days later.  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, cycle Quality of life: good    Pain  Current pain ratin  At worst pain ratin  Location: left medial knee joint  Quality: sharp and dull ache  Relieving factors: change in position and medications (celebrex 200 mg 2x/day)  Aggravating factors: sleeping, stairs, standing and ambulation (more painful  downhill or down the steps, after riding her bike)    Hand dominance: right    Diagnostic Tests  X-ray: abnormal (bone on bone with bone spurs)    Treatments  Previous treatment: injection treatment and physical therapy (Prior PT one year ago on the right knee)  Current treatment: medication  Patient Goals  Patient goals for therapy: increased strength  Patient goal: wants to increase strength and prepare for surgery       Patient Active Problem List    Diagnosis Date Noted    Primary osteoarthritis of left knee 09/03/2024    Early dry stage nonexudative age-related macular degeneration of both eyes 12/13/2022    Artificial lens present 10/30/2019    Bilateral nonexudative age-related macular degeneration 08/26/2019    Knee pain 10/02/2014    Pes anserinus bursitis 10/02/2014    Arthritis of left knee 10/02/2014      Past Medical History:   Diagnosis Date    Arthritis     Cataract     Cataract surgery both eyes 2019    Fracture of wrist Unknown    Right hand    Fracture, foot 2015    Knee swelling Unknown    Both knees    Neuroma of foot Unknown    Left foot    Tear of meniscus of knee Unknown    Left knee    Tennis elbow Unknown    Right arm      Past Surgical History:   Procedure Laterality Date    CHOLECYSTECTOMY      FOOT SURGERY Left     KNEE SURGERY  Unknown    Meniscus repair-left knee        Objective          Palpation   Left   Tenderness of the distal semitendinosus.     Tenderness   Left Knee   Tenderness in the medial joint line and pes anserinus.     Additional Tenderness Details  Left lateral knee tenderness, pes area   Pt with minimal tenderness over the left pes area with palpation     Active Range of Motion   Left Knee   Flexion: 134 degrees   Extension: 8 degrees     Right Knee   Flexion: 140 degrees   Extension: 6 degrees     Patellar Mobility   Left Knee Patellar tendons within functional limits include the medial and lateral. Hypomobile in the left superior and left inferior patellar tendon(s).      Right Knee Patellar tendons within functional limits include the medial and lateral. Hypomobile in the superior and inferior patellar tendon(s).     Strength/Myotome Testing     Left Hip   Planes of Motion   Flexion: 3+  Abduction: 4  Adduction: 5    Right Hip   Planes of Motion   Flexion: 3+  Abduction: 5  Adduction: 4+    Left Knee   Flexion: 4  Extension: 5    Right Knee   Flexion: 5  Extension: 5    Left Ankle/Foot   Dorsiflexion: 5  Plantar flexion: 5  Eversion: 5    Right Ankle/Foot   Dorsiflexion: 5  Plantar flexion: 5  Eversion: 5    Additional Strength Details  Left supine hip flex 4/5, right supine hip flex 4+/5     Tests     Left Hip   90/90 SLR: Negative.   SLR: Negative.     Right Hip   90/90 SLR: Negative.  SLR: Negative.     Left Knee   Negative anterior drawer, lateral Nyla, medial Nyla, patella-femoral grind, posterior drawer, valgus stress test at 0 degrees, valgus stress test at 30 degrees, varus stress test at 0 degrees and varus stress test at 30 degrees.     Right Knee   Negative anterior drawer, lateral Nyla, medial Nyla, patella-femoral grind, posterior drawer, valgus stress test at 0 degrees, valgus stress test at 30 degrees, varus stress test at 0 degrees and varus stress test at 30 degrees.     Additional Tests Details  Palpable PF joint crepitus during Nyla  Left knee popped during varus and valgus stress test           Assessment & Plan       Assessment  Impairments: abnormal or restricted ROM, activity intolerance, impaired physical strength, lacks appropriate home exercise program, pain with function and weight-bearing intolerance   Functional limitations: walking, uncomfortable because of pain, standing, stooping and unable to perform repetitive tasks   Assessment details: Pt is a 70 y/o WF who reports to the clinic with left knee pain, decreased ROM, strength, tenderness, and decreased functional mobility with ambulation, standing, and steps.  Pt's signs and  symptoms are consistent with physical therapy diagnosis of left knee knee pain due to osteoarthritis. Patient is appropriate for skilled PT to instruct her in a HEP to prepare her for her TKR surgery on 11/22/2024.  Pt was educated on anatomy of the knee, possible causes of pain, course of treatment, preparing for her TKR surgery and pt was given a HEP.  Pt only seen for one visit to instruct her in her HEP.    Prognosis: good    Goals  Plan Goals: STGs:1 visit   1.  Pt Independent with HEP for self management of symptoms to prepare for her TKR.          Plan  Therapy options: will be seen for skilled therapy services  Planned modality interventions: cryotherapy and ultrasound  Planned therapy interventions: home exercise program, flexibility, strengthening, stretching, functional ROM exercises, neuromuscular re-education and therapeutic activities  Frequency: 1x week  Duration in visits: 1  Duration in weeks: 1  Treatment plan discussed with: patient          Timed:         Manual Therapy:         mins  20268;     Therapeutic Exercise:   19      mins  86177;     Neuromuscular Pearl:       mins  65837;    Therapeutic Activity:     8     mins  50908;     Gait Training:           mins  28446;     Ultrasound:          mins  89762;    Ionto                                   mins   10073  Self Care                            mins   52954  Canalith Repos         mins 12797      Un-Timed:  Electrical Stimulation:         mins  25579 ( );  Dry Needling          mins self-pay  Traction          mins 19495  Low Eval    20     Mins  92179  Mod Eval          Mins  16671  High Eval                           Mins  27281      Timed Treatment:  27    mins   Total Treatment:     47   mins        PT: Emely Lieberman PT     License Number: [id number]  Electronically signed by Emely Lieberman PT, 09/10/24, 1:38 PM EDT    Certification Period: 9/11/2024 thru 12/9/2024  I certify that the therapy services are furnished while this  patient is under my care.  The services outlined above are required by this patient, and will be reviewed every 90 days.         Physician Signature:__________________________________________________    PHYSICIAN: Agustín Gtz APRN  NPI: [id number]                                      DATE:      Please sign and return via fax to [email or web address] . Thank you, [facility].

## 2024-10-21 DIAGNOSIS — M17.12 ARTHRITIS OF LEFT KNEE: ICD-10-CM

## 2024-10-22 RX ORDER — CELECOXIB 200 MG/1
200 CAPSULE ORAL 2 TIMES DAILY
Qty: 60 CAPSULE | Refills: 0 | Status: SHIPPED | OUTPATIENT
Start: 2024-10-22

## 2024-11-08 ENCOUNTER — PRE-ADMISSION TESTING (OUTPATIENT)
Dept: PREADMISSION TESTING | Facility: HOSPITAL | Age: 69
End: 2024-11-08
Payer: MEDICARE

## 2024-11-08 VITALS
BODY MASS INDEX: 32.39 KG/M2 | HEIGHT: 60 IN | DIASTOLIC BLOOD PRESSURE: 88 MMHG | SYSTOLIC BLOOD PRESSURE: 148 MMHG | OXYGEN SATURATION: 98 % | RESPIRATION RATE: 18 BRPM | TEMPERATURE: 97.9 F | HEART RATE: 60 BPM | WEIGHT: 165 LBS

## 2024-11-08 LAB
ANION GAP SERPL CALCULATED.3IONS-SCNC: 7.6 MMOL/L (ref 5–15)
BUN SERPL-MCNC: 17 MG/DL (ref 8–23)
BUN/CREAT SERPL: 24.3 (ref 7–25)
CALCIUM SPEC-SCNC: 9.3 MG/DL (ref 8.6–10.5)
CHLORIDE SERPL-SCNC: 103 MMOL/L (ref 98–107)
CO2 SERPL-SCNC: 28.4 MMOL/L (ref 22–29)
CREAT SERPL-MCNC: 0.7 MG/DL (ref 0.57–1)
DEPRECATED RDW RBC AUTO: 40.4 FL (ref 37–54)
EGFRCR SERPLBLD CKD-EPI 2021: 93.2 ML/MIN/1.73
ERYTHROCYTE [DISTWIDTH] IN BLOOD BY AUTOMATED COUNT: 12.6 % (ref 12.3–15.4)
GLUCOSE SERPL-MCNC: 99 MG/DL (ref 65–99)
HCT VFR BLD AUTO: 40.9 % (ref 34–46.6)
HGB BLD-MCNC: 13.8 G/DL (ref 12–15.9)
MCH RBC QN AUTO: 30 PG (ref 26.6–33)
MCHC RBC AUTO-ENTMCNC: 33.7 G/DL (ref 31.5–35.7)
MCV RBC AUTO: 88.9 FL (ref 79–97)
PLATELET # BLD AUTO: 253 10*3/MM3 (ref 140–450)
PMV BLD AUTO: 9.8 FL (ref 6–12)
POTASSIUM SERPL-SCNC: 4.7 MMOL/L (ref 3.5–5.2)
QT INTERVAL: 415 MS
QTC INTERVAL: 390 MS
RBC # BLD AUTO: 4.6 10*6/MM3 (ref 3.77–5.28)
SODIUM SERPL-SCNC: 139 MMOL/L (ref 136–145)
WBC NRBC COR # BLD AUTO: 6.32 10*3/MM3 (ref 3.4–10.8)

## 2024-11-08 PROCEDURE — 93005 ELECTROCARDIOGRAM TRACING: CPT

## 2024-11-08 PROCEDURE — 85027 COMPLETE CBC AUTOMATED: CPT

## 2024-11-08 PROCEDURE — 80048 BASIC METABOLIC PNL TOTAL CA: CPT

## 2024-11-08 PROCEDURE — 93010 ELECTROCARDIOGRAM REPORT: CPT | Performed by: INTERNAL MEDICINE

## 2024-11-08 PROCEDURE — 36415 COLL VENOUS BLD VENIPUNCTURE: CPT

## 2024-11-08 NOTE — DISCHARGE INSTRUCTIONS
Take the following medications the morning of surgery:      NO MEDS AM OF SURGERY      If you are on prescription narcotic pain medication to control your pain you may also take that medication the morning of surgery.      General Instructions:     Do not eat solid food after midnight the night before surgery.  Clear liquids day of surgery are allowed but must be stopped at least two hours before your hospital arrival time.       Allowed clear liquids      Water, sodas, and tea or coffee with no cream or milk added.       12 to 20 ounces of a clear liquid that contains carbohydrates is recommended.  If non-diabetic, have Gatorade or Powerade.  If diabetic, have G2 or Powerade Zero.     Do not have liquids red in color.  Do not consume chicken, beef, pork or vegetable broth or bouillon cubes of any variety as they are not considered clear liquids and are not allowed.      Infants may have breast milk up to four hours before surgery.  Infants drinking formula may drink formula up to six hours before surgery.   Patients who avoid smoking, chewing tobacco and alcohol for 4 weeks prior to surgery have a reduced risk of post-operative complications.  Quit smoking as many days before surgery as you can.  Do not smoke, use chewing tobacco or drink alcohol the day of surgery.   If applicable bring your C-PAP/ BI-PAP machine in with you to preop day of surgery.  Bring any papers given to you in the doctor’s office.  Wear clean comfortable clothes.  Do not wear contact lenses, false eyelashes or make-up.  Bring a case for your glasses.   Bring crutches or walker if applicable.  Remove all piercings.  Leave jewelry and any other valuables at home.  Hair extensions with metal clips must be removed prior to surgery.  The Pre-Admission Testing nurse will instruct you to bring medications if unable to obtain an accurate list in Pre-Admission Testing.            Preventing a Surgical Site Infection:  For 2 to 3 days before surgery,  avoid shaving with a razor because the razor can irritate skin and make it easier to develop an infection.    Any areas of open skin can increase the risk of a post-operative wound infection by allowing bacteria to enter and travel throughout the body.  Notify your surgeon if you have any skin wounds / rashes even if it is not near the expected surgical site.  The area will need assessed to determine if surgery should be delayed until it is healed.  The night prior to surgery shower using a fresh bar of anti-bacterial soap (such as Dial) and clean washcloth.  Sleep in a clean bed with clean clothing.  Do not allow pets to sleep with you.  Shower on the morning of surgery using a fresh bar of anti-bacterial soap (such as Dial) and clean washcloth.  Dry with a clean towel and dress in clean clothing.  Ask your surgeon if you will be receiving antibiotics prior to surgery.  Make sure you, your family, and all healthcare providers clean their hands with soap and water or an alcohol based hand  before caring for you or your wound.    Day of surgery:  Your arrival time is approximately two hours before your scheduled surgery time.  Please note if you have an early arrival time the surgery doors do not open before 5:00 AM.  Upon arrival, a Pre-op nurse and Anesthesiologist will review your health history, obtain vital signs, and answer questions you may have.  The only belongings needed at this time will be a list of your home medications and if applicable your C-PAP/BI-PAP machine.  A Pre-op nurse will start an IV and you may receive medication in preparation for surgery, including something to help you relax.     Please be aware that surgery does come with discomfort.  We want to make every effort to control your discomfort so please discuss any uncontrolled symptoms with your nurse.   Your doctor will most likely have prescribed pain medications.      If you are going home after surgery you will receive  individualized written care instructions before being discharged.  A responsible adult must drive you to and from the hospital on the day of your surgery and ideally stay with you through the night.   .  Discharge prescriptions can be filled by the hospital pharmacy during regular pharmacy hours.  If you are having surgery late in the day/evening your prescription may be e-prescribed to your pharmacy.  Please verify your pharmacy hours or chose a 24 hour pharmacy to avoid not having access to your prescription because your pharmacy has closed for the day.    If you are staying overnight following surgery, you will be transported to your hospital room following the recovery period.  [facility] has all private rooms.    If you have any questions please call Pre-Admission Testing at [phone].  Deductibles and co-payments are collected on the day of service. Please be prepared to pay the required co-pay, deductible or deposit on the day of service as defined by your plan.    Call your surgeon immediately if you experience any of the following symptoms:  Sore Throat  Shortness of Breath or difficulty breathing  Cough  Chills  Body soreness or muscle pain  Headache  Fever  New loss of taste or smell  Do not arrive for your surgery ill.  Your procedure will need to be rescheduled to another time.  You will need to call your physician before the day of surgery to avoid any unnecessary exposure to hospital staff as well as other patients.  CHLORHEXIDINE CLOTH INSTRUCTIONS  The morning of surgery follow these instructions using the Chlorhexidine cloths you've been given.  These steps reduce bacteria on the body.  Do not use the cloths near your eyes, ears mouth, genitalia or on open wounds.  Throw the cloths away after use but do not try to flush them down a toilet.      Open and remove one cloth at a time from the package.    Leave the cloth unfolded and begin the bathing.  Massage the skin with the  cloths using gentle pressure to remove bacteria.  Do not scrub harshly.   Follow the steps below with one 2% CHG cloth per area (6 total cloths).  One cloth for neck, shoulders and chest.  One cloth for both arms, hands, fingers and underarms (do underarms last).  One cloth for the abdomen followed by groin.  One cloth for right leg and foot including between the toes.  One cloth for left leg and foot including between the toes.  The last cloth is to be used for the back of the neck, back and buttocks.    Allow the CHG to air dry 3 minutes on the skin which will give it time to work and decrease the chance of irritation.  The skin may feel sticky until it is dry.  Do not rinse with water or any other liquid or you will lose the beneficial effects of the CHG.  If mild skin irritation occurs, do rinse the skin to remove the CHG.  Report this to the nurse at time of admission.  Do not apply lotions, creams, ointments, deodorants or perfumes after using the clothes. Dress in clean clothes before coming to the hospital.

## 2024-11-11 ENCOUNTER — TELEPHONE (OUTPATIENT)
Dept: ORTHOPEDIC SURGERY | Facility: CLINIC | Age: 69
End: 2024-11-11
Payer: MEDICARE

## 2024-11-11 ENCOUNTER — TELEPHONE (OUTPATIENT)
Dept: INTERNAL MEDICINE | Facility: CLINIC | Age: 69
End: 2024-11-11

## 2024-11-11 DIAGNOSIS — R94.31 ABNORMAL EKG: Primary | ICD-10-CM

## 2024-11-11 NOTE — TELEPHONE ENCOUNTER
"Spoke to patient and relayed message from EMERITA Kim, \"Please let patient know that EKG is abnormal, would recommend that we send EKG to PCP for them to compare it to previous EKGs and will defer to them as to whether she needs cardiac clearance \"    Spoke to PCP's office and routed results to PCP for review. Office confirmed receipt and will follow up.   "

## 2024-11-11 NOTE — TELEPHONE ENCOUNTER
Dr. Jason Minaya's office called about the patient's abnormal EKG. She is scheduled for knee replacement surgery on Nov. 22nd. They want to know if the patient needs cardiac clearance before the surgery. She sent the results to you this morning.

## 2024-11-13 ENCOUNTER — TELEPHONE (OUTPATIENT)
Dept: INTERNAL MEDICINE | Facility: CLINIC | Age: 69
End: 2024-11-13
Payer: MEDICARE

## 2024-11-13 NOTE — TELEPHONE ENCOUNTER
Caller: Moriah Morris    Relationship: Self    Best call back number:     What is the best time to reach you:     Who are you requesting to speak with (clinical staff, provider,  specific staff member):     Do you know the name of the person who called:     What was the call regarding: PATIENT IS CALLING IN TO DISCUSS HER EKG SCAN FROM [facility].  SHE SAYS SHE IS TO BE REFERRED TO [facility] AND SHE WANTS TO DISCUSS THIS AS WELL.    Is it okay if the provider responds through MyChart:

## 2024-11-14 ENCOUNTER — OFFICE VISIT (OUTPATIENT)
Dept: ORTHOPEDIC SURGERY | Facility: CLINIC | Age: 69
End: 2024-11-14
Payer: MEDICARE

## 2024-11-14 ENCOUNTER — TELEPHONE (OUTPATIENT)
Dept: ORTHOPEDIC SURGERY | Facility: HOSPITAL | Age: 69
End: 2024-11-14
Payer: MEDICARE

## 2024-11-14 VITALS
WEIGHT: 165.6 LBS | HEIGHT: 60 IN | OXYGEN SATURATION: 96 % | DIASTOLIC BLOOD PRESSURE: 92 MMHG | HEART RATE: 76 BPM | BODY MASS INDEX: 32.51 KG/M2 | SYSTOLIC BLOOD PRESSURE: 132 MMHG | TEMPERATURE: 98.7 F

## 2024-11-14 DIAGNOSIS — R52 PAIN: Primary | ICD-10-CM

## 2024-11-14 PROCEDURE — 1159F MED LIST DOCD IN RCRD: CPT | Performed by: NURSE PRACTITIONER

## 2024-11-14 PROCEDURE — 1160F RVW MEDS BY RX/DR IN RCRD: CPT | Performed by: NURSE PRACTITIONER

## 2024-11-14 PROCEDURE — S0260 H&P FOR SURGERY: HCPCS | Performed by: NURSE PRACTITIONER

## 2024-11-14 PROCEDURE — 77077 JOINT SURVEY SINGLE VIEW: CPT | Performed by: ORTHOPAEDIC SURGERY

## 2024-11-14 RX ORDER — PSYLLIUM SEED (WITH DEXTROSE)
POWDER (GRAM) ORAL DAILY
COMMUNITY
End: 2024-11-15

## 2024-11-14 NOTE — H&P
Patient: Moriah Morris    Date of Admission: 11/22/2024    YOB: 1955    Medical Record Number: [id number]    Admitting Physician: Dr. Jason Minaya    Reason for Admission: End Stage Left Knee OA    History of Present Illness: 69 y.o. female presents with severe end stage knee osteoarthritis which has not been responsive to the full compliment of conservative measures. Despite conservative attempts, there is still severe, constant activity-limiting pain. Given the severity of the pain, the patient has elected to proceed with knee replacement.    Allergies: No Known Allergies      Current Medications:  Home Medications:    Current Outpatient Medications on File Prior to Visit   Medication Sig    celecoxib (CeleBREX) 200 MG capsule TAKE 1 CAPSULE BY MOUTH TWICE A DAY (Patient taking differently: Take 1 capsule by mouth 2 (Two) Times a Day. INSTRUCTED PT TO ASK DR MINAYA IF NEEDS TO BE HELD FOR SURGERY)    Cholecalciferol (Vitamin D) 50 MCG (2000 UT) capsule Take 1 capsule by mouth Daily. TO HOLD 1 WEEK BEFORE SURGERY    Multiple Vitamins-Minerals (Eye Vitamins) capsule Take 2 capsules by mouth 2 (Two) Times a Day. TO HOLD 1 WEEK BEFORE SURGERY    Psyllium (Metamucil) wafer wafer Take  by mouth Daily. Fiber gummies     No current facility-administered medications on file prior to visit.     PRN Meds:.    PMH:     Past Medical History:   Diagnosis Date    Arthritis     Cataract     Cataract surgery both eyes 2019    Fracture of wrist Unknown    Right hand    Fracture, foot 2015    Knee swelling Unknown    Both knees    Neuroma of foot Unknown    Left foot    Tear of meniscus of knee Unknown    Left knee    Tennis elbow Unknown    Right arm       PF/Surg/Soc Hx:     Past Surgical History:   Procedure Laterality Date    ANKLE ARTHROSCOPY Left     CATARACT EXTRACTION, BILATERAL      CHOLECYSTECTOMY      COLONOSCOPY      FOOT SURGERY Left     FOR FOOT FRACTURE    KNEE MENISCECTOMY Left     KNEE SURGERY  Unknown  "   Meniscus repair-left knee        Social History     Occupational History    Not on file   Tobacco Use    Smoking status: Never     Passive exposure: Never    Smokeless tobacco: Never   Vaping Use    Vaping status: Never Used   Substance and Sexual Activity    Alcohol use: Yes     Alcohol/week: 1.0 standard drink of alcohol     Types: 1 Cans of beer per week     Comment: drink occasionally- maybe one or 2 drinks per month    Drug use: Never    Sexual activity: Not Currently     Birth control/protection: Abstinence      Social History     Social History Narrative    Not on file        Family History   Problem Relation Age of Onset    Diabetes Mother         Type 2    Hypertension Mother     Stroke Mother 76    Dementia Mother     Heart attack Mother     Depression Mother     Osteoporosis Mother     Diabetes Father         Type 2    Hypertension Father     Lupus Father 62    Heart attack Father     Diabetes Sister         Type 2    Hypertension Sister     Rheum arthritis Sister     Diabetes Sister         Type 2    Fibromyalgia Sister     Osteoporosis Sister     Cancer Sister         -liver cancer    Breast cancer Neg Hx     Colon cancer Neg Hx     Malig Hyperthermia Neg Hx          Review of Systems:   A 14 point review of systems was performed, pertinent positives discussed above, all other systems are negative    Physical Exam: 69 y.o. female  Vital Signs :   Vitals:    24 1453   BP: 132/92   Pulse: 76   Temp: 98.7 °F (37.1 °C)   TempSrc: Temporal   SpO2: 96%   Weight: 75.1 kg (165 lb 9.6 oz)   Height: 151.8 cm (59.75\")   PainSc: 0-No pain   PainLoc: Knee     General: Alert and Oriented x 3, No acute distress.  Psych: mood and affect appropriate; recent and remote memory intact  Eyes: conjunctivae clear; pupils equally round and reactive, sclerae anicteric  CV: no peripheral edema  Resp: normal respiratory effort  Skin: no rashes or wounds; normal turgor    Xrays:  -3 views (AP, lateral, and " sunrise) were reviewed demonstrating end-stage OA with bone on bone articulation.  -A full length AP xray was ordered and reviewed today for purposes of operative alignment demonstrating end stage arthritic findings. There are no previous full length films for review    Assessment:  End-stage Left knee osteoarthritis. Conservative measures have failed.      Plan:  The plan is to proceed with Left Total Knee Replacement. The patient voiced understanding of the risks, benefits, and alternative forms of treatment that were discussed with Dr Minaya at the time of scheduling.  Same day [facility]    Diann Moore, APRN  11/14/2024

## 2024-11-14 NOTE — H&P (VIEW-ONLY)
Patient: Moriah Morris    Date of Admission: 11/22/2024    YOB: 1955    Medical Record Number: [id number]    Admitting Physician: Dr. Jason Minaya    Reason for Admission: End Stage Left Knee OA    History of Present Illness: 69 y.o. female presents with severe end stage knee osteoarthritis which has not been responsive to the full compliment of conservative measures. Despite conservative attempts, there is still severe, constant activity-limiting pain. Given the severity of the pain, the patient has elected to proceed with knee replacement.    Allergies: No Known Allergies      Current Medications:  Home Medications:    Current Outpatient Medications on File Prior to Visit   Medication Sig    celecoxib (CeleBREX) 200 MG capsule TAKE 1 CAPSULE BY MOUTH TWICE A DAY (Patient taking differently: Take 1 capsule by mouth 2 (Two) Times a Day. INSTRUCTED PT TO ASK DR MINAYA IF NEEDS TO BE HELD FOR SURGERY)    Cholecalciferol (Vitamin D) 50 MCG (2000 UT) capsule Take 1 capsule by mouth Daily. TO HOLD 1 WEEK BEFORE SURGERY    Multiple Vitamins-Minerals (Eye Vitamins) capsule Take 2 capsules by mouth 2 (Two) Times a Day. TO HOLD 1 WEEK BEFORE SURGERY    Psyllium (Metamucil) wafer wafer Take  by mouth Daily. Fiber gummies     No current facility-administered medications on file prior to visit.     PRN Meds:.    PMH:     Past Medical History:   Diagnosis Date    Arthritis     Cataract     Cataract surgery both eyes 2019    Fracture of wrist Unknown    Right hand    Fracture, foot 2015    Knee swelling Unknown    Both knees    Neuroma of foot Unknown    Left foot    Tear of meniscus of knee Unknown    Left knee    Tennis elbow Unknown    Right arm       PF/Surg/Soc Hx:     Past Surgical History:   Procedure Laterality Date    ANKLE ARTHROSCOPY Left     CATARACT EXTRACTION, BILATERAL      CHOLECYSTECTOMY      COLONOSCOPY      FOOT SURGERY Left     FOR FOOT FRACTURE    KNEE MENISCECTOMY Left     KNEE SURGERY  Unknown  "   Meniscus repair-left knee        Social History     Occupational History    Not on file   Tobacco Use    Smoking status: Never     Passive exposure: Never    Smokeless tobacco: Never   Vaping Use    Vaping status: Never Used   Substance and Sexual Activity    Alcohol use: Yes     Alcohol/week: 1.0 standard drink of alcohol     Types: 1 Cans of beer per week     Comment: drink occasionally- maybe one or 2 drinks per month    Drug use: Never    Sexual activity: Not Currently     Birth control/protection: Abstinence      Social History     Social History Narrative    Not on file        Family History   Problem Relation Age of Onset    Diabetes Mother         Type 2    Hypertension Mother     Stroke Mother 76    Dementia Mother     Heart attack Mother     Depression Mother     Osteoporosis Mother     Diabetes Father         Type 2    Hypertension Father     Lupus Father 62    Heart attack Father     Diabetes Sister         Type 2    Hypertension Sister     Rheum arthritis Sister     Diabetes Sister         Type 2    Fibromyalgia Sister     Osteoporosis Sister     Cancer Sister         -liver cancer    Breast cancer Neg Hx     Colon cancer Neg Hx     Malig Hyperthermia Neg Hx          Review of Systems:   A 14 point review of systems was performed, pertinent positives discussed above, all other systems are negative    Physical Exam: 69 y.o. female  Vital Signs :   Vitals:    24 1453   BP: 132/92   Pulse: 76   Temp: 98.7 °F (37.1 °C)   TempSrc: Temporal   SpO2: 96%   Weight: 75.1 kg (165 lb 9.6 oz)   Height: 151.8 cm (59.75\")   PainSc: 0-No pain   PainLoc: Knee     General: Alert and Oriented x 3, No acute distress.  Psych: mood and affect appropriate; recent and remote memory intact  Eyes: conjunctivae clear; pupils equally round and reactive, sclerae anicteric  CV: no peripheral edema  Resp: normal respiratory effort  Skin: no rashes or wounds; normal turgor    Xrays:  -3 views (AP, lateral, and " sunrise) were reviewed demonstrating end-stage OA with bone on bone articulation.  -A full length AP xray was ordered and reviewed today for purposes of operative alignment demonstrating end stage arthritic findings. There are no previous full length films for review    Assessment:  End-stage Left knee osteoarthritis. Conservative measures have failed.      Plan:  The plan is to proceed with Left Total Knee Replacement. The patient voiced understanding of the risks, benefits, and alternative forms of treatment that were discussed with Dr Minaya at the time of scheduling.  Same day [facility]    Diann Moore, APRN  11/14/2024

## 2024-11-14 NOTE — TELEPHONE ENCOUNTER
Risk Factor yes no   Age >75  X   BMI <20 >40  X   Patient History     Chronic Pain (2 or more meds/Pain Management)  X   ETOH (more than 3 drinks Daily)  X   Uncontrolled Depression/Bipolar/Schizoaffective Disorder  X   Arrhythmias--  X   Stent placement/MI  X   DVT/PE  X   Pacemaker  X   HTN (uncontrolled or requiring more than 2 medications)  X   CHF/Retained fluids/Edema  X   Stroke with Residual   X   COPD/Asthma  X   ULISSES--Non-compliant with CPAP  X   Diabetes (on insulin or more than 2 meds)         A1C: 5.4  X   BPH/Urinary retention (on medication)  X   CKD  X   Home environment and support     Current ambulation status (use of cane, walker, W/C, Multiple falls/weakness)  X   Stairs to enter and throughout home X    Lives Alone  X   Doesn't have support at home  X   Outpatient Screening Assessment    Home needs: (Walker/BSC):  Needs walker--ordered in [facility]   ? Steps 2-3 steps   Caregiver 24-48hrs post-discharge: [facility]    Discharge Plan:   [facility]     Prescriptions: Meds to bed    Home medications:   [] Blood thinner/anti-coag therapy--   [] BPH or diuretic--  [] BP meds--   ? Pain/Anti-inflammatories--Celebrex  Pre-op Education:  Educate patient on spinal anesthesia/pain control:  ? patient verbalize understanding    Educate patient on hospital course/timeline:  ?  patient verbalize understanding    Joint Care Class:  ?  yes [] no  Notes:

## 2024-11-15 ENCOUNTER — OFFICE VISIT (OUTPATIENT)
Dept: CARDIOLOGY | Facility: CLINIC | Age: 69
End: 2024-11-15
Payer: MEDICARE

## 2024-11-15 VITALS
OXYGEN SATURATION: 98 % | BODY MASS INDEX: 32.39 KG/M2 | RESPIRATION RATE: 16 BRPM | SYSTOLIC BLOOD PRESSURE: 130 MMHG | WEIGHT: 165 LBS | HEIGHT: 60 IN | DIASTOLIC BLOOD PRESSURE: 80 MMHG | HEART RATE: 54 BPM

## 2024-11-15 DIAGNOSIS — R94.31 ABNORMAL EKG: Primary | ICD-10-CM

## 2024-11-15 PROCEDURE — 1159F MED LIST DOCD IN RCRD: CPT | Performed by: INTERNAL MEDICINE

## 2024-11-15 PROCEDURE — 93000 ELECTROCARDIOGRAM COMPLETE: CPT | Performed by: INTERNAL MEDICINE

## 2024-11-15 PROCEDURE — 1160F RVW MEDS BY RX/DR IN RCRD: CPT | Performed by: INTERNAL MEDICINE

## 2024-11-15 PROCEDURE — 99204 OFFICE O/P NEW MOD 45 MIN: CPT | Performed by: INTERNAL MEDICINE

## 2024-11-15 NOTE — PROGRESS NOTES
[facility]      Patient Name: Moriah Morris  :1955  Age: 69 y.o.  Encounter Provider:  Amanuel Peguero Jr, MD      Chief Complaint: 69-year-old female with past medical history of osteoarthritis presents for evaluation of abnormal EKG.      HPI  Moriah Morris is a 69 y.o. female past medical history of osteoarthritis who is tentatively scheduled for total knee arthroplasty with Dr. Jason Minaya presents after preop evaluation was noted to have abnormal EKG.  On preop EKG the computer evaluation is old inferior infarct.  Patient has no cardiac history.  She rides her bicycle 25+ miles per week with no chest pain or shortness of air.  Knee pain prevents her from weightbearing exercise.  She denies orthopnea, PND or edema.  No palpitations, dizziness or syncope.  Her father had myocardial infarction in his 70s at which time he had angioplasty.  She has no other family members with coronary disease.  She is a lifelong non-smoker drinks socially and denies illicit drug use.  EKG today shows sinus rhythm with left anterior deviation and nonspecific repolarization abnormalities.      The following portions of the patient's history were reviewed and updated as appropriate: allergies, current medications, past family history, past medical history, past social history, past surgical history and problem list.      Review of Systems   Constitutional: Negative for chills and fever.   HENT:  Negative for hoarse voice and sore throat.    Eyes:  Negative for double vision and photophobia.   Cardiovascular:  Negative for chest pain, leg swelling, near-syncope, orthopnea, palpitations, paroxysmal nocturnal dyspnea and syncope.   Respiratory:  Negative for cough and wheezing.    Skin:  Negative for poor wound healing and rash.   Musculoskeletal:  Positive for joint pain. Negative for arthritis and joint swelling.   Gastrointestinal:  Negative for bloating, abdominal pain, hematemesis and hematochezia.  "  Neurological:  Negative for dizziness and focal weakness.   Psychiatric/Behavioral:  Negative for depression and suicidal ideas.        OBJECTIVE:   Vital Signs  Vitals:    11/15/24 1017   BP: 130/80   Pulse: 54   Resp: 16   SpO2: 98%     Estimated body mass index is 32.22 kg/m² as calculated from the following:    Height as of this encounter: 152.4 cm (60\").    Weight as of this encounter: 74.8 kg (165 lb).    Vitals reviewed.   Constitutional:       Appearance: Healthy appearance. Not in distress.   Neck:      Vascular: No JVR. JVD normal.   Pulmonary:      Effort: Pulmonary effort is normal.      Breath sounds: Normal breath sounds. No wheezing. No rhonchi. No rales.   Chest:      Chest wall: Not tender to palpatation.   Cardiovascular:      PMI at left midclavicular line. Normal rate. Regular rhythm. Normal S1. Normal S2.       Murmurs: There is no murmur.      No gallop.  No click. No rub.   Pulses:     Intact distal pulses.   Edema:     Peripheral edema absent.   Abdominal:      General: Bowel sounds are normal.      Palpations: Abdomen is soft.      Tenderness: There is no abdominal tenderness.   Musculoskeletal: Normal range of motion.         General: No tenderness. Skin:     General: Skin is warm and dry.   Neurological:      General: No focal deficit present.      Mental Status: Alert and oriented to person, place and time.         ECG 12 Lead    Date/Time: 11/15/2024 12:07 PM  Performed by: Amanuel Peguero Jr., MD    Authorized by: Amanuel Peguero Jr., MD  Comparison: compared with previous ECG from 11/8/2024  Comparison to previous ECG: There is no evidence for inferior infarct on current tracing compared to previous  Rhythm: sinus rhythm  QRS axis: left  Other findings: non-specific ST-T wave changes    Clinical impression: non-specific ECG        Lipid Panel          3/8/2024    07:48   Lipid Panel   Total Cholesterol 246    Triglycerides 107    HDL Cholesterol 69    VLDL Cholesterol 19    LDL " Cholesterol  158         BUN   Date Value Ref Range Status   11/08/2024 17 8 - 23 mg/dL Final     Creatinine   Date Value Ref Range Status   11/08/2024 0.70 0.57 - 1.00 mg/dL Final     Potassium   Date Value Ref Range Status   11/08/2024 4.7 3.5 - 5.2 mmol/L Final     ALT (SGPT)   Date Value Ref Range Status   03/08/2024 29 0 - 32 IU/L Final     AST (SGOT)   Date Value Ref Range Status   03/08/2024 22 0 - 40 IU/L Final           ASSESSMENT:     69-year-old female with osteoarthritis in need of TKA presents for evaluation of abnormal EKG      PLAN OF CARE:     Preoperative risk assessment -EKG today does not show evidence for old inferior infarct and I feel this was likely lead placement.  Patient has good functional capacity with no limiting symptoms and is low risk for perioperative MACE.  No indication for further cardiac testing.  No indication for initiation of beta-blocker.  OA  - scheduled for TKA with Dr Minaya. No indication for further cardiac testing.  I will send preoperative risk assessment letter to Dr. Minaya.    I will see the patient as needed.  Please call with any questions or concerns.             Discharge Medications            Accurate as of November 15, 2024 12:05 PM. If you have any questions, ask your nurse or doctor.                Continue These Medications        Instructions Start Date   BENEFIBER PO   Take  by mouth.      celecoxib 200 MG capsule  Commonly known as: CeleBREX   200 mg, Oral, 2 Times Daily      Eye Vitamins capsule   2 capsules, 2 Times Daily      Vitamin D 50 MCG (2000 UT) capsule   1 capsule, Daily               Thank you for allowing me to participate in the care of your patient,      Sincerely,   Amanuel Peguero Jr, MD  [facility]  11/15/24  12:05 EST

## 2024-11-21 ENCOUNTER — TELEPHONE (OUTPATIENT)
Dept: ORTHOPEDIC SURGERY | Facility: CLINIC | Age: 69
End: 2024-11-21
Payer: MEDICARE

## 2024-11-22 ENCOUNTER — ANESTHESIA EVENT (OUTPATIENT)
Dept: PERIOP | Facility: HOSPITAL | Age: 69
End: 2024-11-22
Payer: MEDICARE

## 2024-11-22 ENCOUNTER — DOCUMENTATION (OUTPATIENT)
Dept: HOME HEALTH SERVICES | Facility: HOME HEALTHCARE | Age: 69
End: 2024-11-22
Payer: MEDICARE

## 2024-11-22 ENCOUNTER — HOSPITAL ENCOUNTER (OUTPATIENT)
Facility: HOSPITAL | Age: 69
Setting detail: HOSPITAL OUTPATIENT SURGERY
Discharge: HOME HEALTH | End: 2024-11-22
Attending: ORTHOPAEDIC SURGERY | Admitting: ORTHOPAEDIC SURGERY
Payer: MEDICARE

## 2024-11-22 ENCOUNTER — APPOINTMENT (OUTPATIENT)
Dept: GENERAL RADIOLOGY | Facility: HOSPITAL | Age: 69
End: 2024-11-22
Payer: MEDICARE

## 2024-11-22 ENCOUNTER — HOME HEALTH ADMISSION (OUTPATIENT)
Dept: HOME HEALTH SERVICES | Facility: HOME HEALTHCARE | Age: 69
End: 2024-11-22
Payer: MEDICARE

## 2024-11-22 ENCOUNTER — ANESTHESIA (OUTPATIENT)
Dept: PERIOP | Facility: HOSPITAL | Age: 69
End: 2024-11-22
Payer: MEDICARE

## 2024-11-22 VITALS
SYSTOLIC BLOOD PRESSURE: 104 MMHG | RESPIRATION RATE: 16 BRPM | TEMPERATURE: 97.7 F | HEART RATE: 72 BPM | DIASTOLIC BLOOD PRESSURE: 88 MMHG | OXYGEN SATURATION: 96 %

## 2024-11-22 DIAGNOSIS — M17.12 PRIMARY OSTEOARTHRITIS OF LEFT KNEE: ICD-10-CM

## 2024-11-22 DIAGNOSIS — Z98.890 S/P KNEE SURGERY: Primary | ICD-10-CM

## 2024-11-22 PROCEDURE — C1776 JOINT DEVICE (IMPLANTABLE): HCPCS | Performed by: ORTHOPAEDIC SURGERY

## 2024-11-22 PROCEDURE — 25010000002 ACETAMINOPHEN 10 MG/ML SOLUTION

## 2024-11-22 PROCEDURE — 25010000002 FENTANYL CITRATE (PF) 100 MCG/2ML SOLUTION

## 2024-11-22 PROCEDURE — 25010000002 PROPOFOL 200 MG/20ML EMULSION

## 2024-11-22 PROCEDURE — 25010000002 VANCOMYCIN 1 G RECONSTITUTED SOLUTION 1 EACH VIAL: Performed by: NURSE PRACTITIONER

## 2024-11-22 PROCEDURE — 25010000002 FENTANYL CITRATE (PF) 50 MCG/ML SOLUTION

## 2024-11-22 PROCEDURE — 25010000002 HYDROMORPHONE 1 MG/ML SOLUTION

## 2024-11-22 PROCEDURE — 25010000002 ROPIVACAINE PER 1 MG: Performed by: ORTHOPAEDIC SURGERY

## 2024-11-22 PROCEDURE — 25010000002 PROPOFOL 10 MG/ML EMULSION

## 2024-11-22 PROCEDURE — C1713 ANCHOR/SCREW BN/BN,TIS/BN: HCPCS | Performed by: ORTHOPAEDIC SURGERY

## 2024-11-22 PROCEDURE — 25010000002 KETOROLAC TROMETHAMINE PER 15 MG: Performed by: ORTHOPAEDIC SURGERY

## 2024-11-22 PROCEDURE — 25010000002 CEFAZOLIN PER 500 MG: Performed by: NURSE PRACTITIONER

## 2024-11-22 PROCEDURE — 73560 X-RAY EXAM OF KNEE 1 OR 2: CPT

## 2024-11-22 PROCEDURE — 25810000003 SODIUM CHLORIDE 0.9 % SOLUTION 250 ML FLEX CONT: Performed by: NURSE PRACTITIONER

## 2024-11-22 PROCEDURE — 97161 PT EVAL LOW COMPLEX 20 MIN: CPT

## 2024-11-22 PROCEDURE — 25010000002 FENTANYL CITRATE (PF) 50 MCG/ML SOLUTION: Performed by: STUDENT IN AN ORGANIZED HEALTH CARE EDUCATION/TRAINING PROGRAM

## 2024-11-22 PROCEDURE — 25010000002 EPINEPHRINE 1 MG/ML SOLUTION 30 ML VIAL: Performed by: ORTHOPAEDIC SURGERY

## 2024-11-22 PROCEDURE — 25010000002 DEXAMETHASONE SODIUM PHOSPHATE 20 MG/5ML SOLUTION

## 2024-11-22 PROCEDURE — 25010000002 LIDOCAINE PF 2% 2 % SOLUTION

## 2024-11-22 PROCEDURE — 25010000002 ROPIVACAINE PER 1 MG: Performed by: STUDENT IN AN ORGANIZED HEALTH CARE EDUCATION/TRAINING PROGRAM

## 2024-11-22 PROCEDURE — 25010000002 DEXAMETHASONE PER 1 MG: Performed by: STUDENT IN AN ORGANIZED HEALTH CARE EDUCATION/TRAINING PROGRAM

## 2024-11-22 PROCEDURE — 97110 THERAPEUTIC EXERCISES: CPT

## 2024-11-22 PROCEDURE — 27447 TOTAL KNEE ARTHROPLASTY: CPT | Performed by: NURSE PRACTITIONER

## 2024-11-22 PROCEDURE — 25010000002 SUGAMMADEX 200 MG/2ML SOLUTION

## 2024-11-22 PROCEDURE — 25010000002 MAGNESIUM SULFATE PER 500 MG OF MAGNESIUM

## 2024-11-22 PROCEDURE — 25010000002 ONDANSETRON PER 1 MG

## 2024-11-22 PROCEDURE — 25010000002 MORPHINE PER 10 MG: Performed by: ORTHOPAEDIC SURGERY

## 2024-11-22 PROCEDURE — 27447 TOTAL KNEE ARTHROPLASTY: CPT | Performed by: ORTHOPAEDIC SURGERY

## 2024-11-22 PROCEDURE — 25010000002 GLYCOPYRROLATE 0.2 MG/ML SOLUTION

## 2024-11-22 PROCEDURE — 25810000003 LACTATED RINGERS PER 1000 ML: Performed by: STUDENT IN AN ORGANIZED HEALTH CARE EDUCATION/TRAINING PROGRAM

## 2024-11-22 PROCEDURE — 25010000002 LIDOCAINE PF 1% 1 % SOLUTION: Performed by: STUDENT IN AN ORGANIZED HEALTH CARE EDUCATION/TRAINING PROGRAM

## 2024-11-22 DEVICE — VIOLET ANTIBACTERIAL POLYDIOXANONE, KNOTLESS TISSUE CONTROL DEVICE
Type: IMPLANTABLE DEVICE | Site: KNEE | Status: FUNCTIONAL
Brand: STRATAFIX

## 2024-11-22 DEVICE — LEGION NARROW CRUCIATE RETAINING                                    OXINIUM SIZE 4N LEFT
Type: IMPLANTABLE DEVICE | Site: KNEE | Status: FUNCTIONAL
Brand: LEGION

## 2024-11-22 DEVICE — IMPLANTABLE DEVICE: Type: IMPLANTABLE DEVICE | Status: FUNCTIONAL

## 2024-11-22 DEVICE — GENESIS II NON-POROUS TIBIAL                                    BASEPLATE SIZE 2 LEFT
Type: IMPLANTABLE DEVICE | Site: KNEE | Status: FUNCTIONAL
Brand: GENESIS II

## 2024-11-22 DEVICE — KNOTLESS TISSUE CONTROL DEVICE, UNDYED UNIDIRECTIONAL (ANTIBACTERIAL) SYNTHETIC ABSORBABLE DEVICE
Type: IMPLANTABLE DEVICE | Site: KNEE | Status: FUNCTIONAL
Brand: STRATAFIX

## 2024-11-22 DEVICE — PALACOS® R IS A FAST-CURING, RADIOPAQUE, POLY(METHYL METHACRYLATE)-BASED BONE CEMENT.PALACOS ® R CONTAINS THE X-RAY CONTRAST MEDIUM ZIRCONIUM DIOXIDE. TO IMPROVE VISIBILITY IN THE SURGICAL FIELD PALACOS ® R HAS BEEN COLOURED WITH CHLOROPHYLL (E141). THE BONE CEMENT IS PREPARED DIRECTLY BEFORE USE BY MIXING A POLYMER POWDER COMPONENT WITH A LIQUID MONOMER COMPONENT. A DUCTILE DOUGH FORMS WHICH CURES WITHIN A FEW MINUTES.
Type: IMPLANTABLE DEVICE | Site: KNEE | Status: FUNCTIONAL
Brand: PALACOS®

## 2024-11-22 DEVICE — GENESIS II BICONVEX PATELLA 26MM
Type: IMPLANTABLE DEVICE | Site: KNEE | Status: FUNCTIONAL
Brand: GENESIS II

## 2024-11-22 DEVICE — LEGION CRUCIATE RETAINING HIGH                                    FLEX HIGHLY CROSS LINKED                                    POLYETHYLENE SIZE 1-2 9MM
Type: IMPLANTABLE DEVICE | Site: KNEE | Status: FUNCTIONAL
Brand: LEGION

## 2024-11-22 RX ORDER — LIDOCAINE HYDROCHLORIDE 10 MG/ML
0.5 INJECTION, SOLUTION INFILTRATION; PERINEURAL ONCE AS NEEDED
Status: COMPLETED | OUTPATIENT
Start: 2024-11-22 | End: 2024-11-22

## 2024-11-22 RX ORDER — KETAMINE HCL IN NACL, ISO-OSM 100MG/10ML
SYRINGE (ML) INJECTION AS NEEDED
Status: DISCONTINUED | OUTPATIENT
Start: 2024-11-22 | End: 2024-11-22 | Stop reason: SURG

## 2024-11-22 RX ORDER — ASPIRIN 81 MG/1
81 TABLET ORAL 2 TIMES DAILY
Status: DISCONTINUED | OUTPATIENT
Start: 2024-11-23 | End: 2024-11-22

## 2024-11-22 RX ORDER — ACETAMINOPHEN 325 MG/1
650 TABLET ORAL EVERY 6 HOURS PRN
Status: DISCONTINUED | OUTPATIENT
Start: 2024-11-22 | End: 2024-11-22 | Stop reason: HOSPADM

## 2024-11-22 RX ORDER — MELOXICAM 7.5 MG/1
15 TABLET ORAL ONCE
Status: DISCONTINUED | OUTPATIENT
Start: 2024-11-22 | End: 2024-11-22

## 2024-11-22 RX ORDER — PROMETHAZINE HYDROCHLORIDE 25 MG/1
25 SUPPOSITORY RECTAL ONCE AS NEEDED
Status: DISCONTINUED | OUTPATIENT
Start: 2024-11-22 | End: 2024-11-22 | Stop reason: HOSPADM

## 2024-11-22 RX ORDER — FENTANYL CITRATE 50 UG/ML
50 INJECTION, SOLUTION INTRAMUSCULAR; INTRAVENOUS
Status: DISCONTINUED | OUTPATIENT
Start: 2024-11-22 | End: 2024-11-22 | Stop reason: HOSPADM

## 2024-11-22 RX ORDER — ACETAMINOPHEN 10 MG/ML
INJECTION, SOLUTION INTRAVENOUS AS NEEDED
Status: DISCONTINUED | OUTPATIENT
Start: 2024-11-22 | End: 2024-11-22 | Stop reason: SURG

## 2024-11-22 RX ORDER — POLYETHYLENE GLYCOL 3350 17 G/17G
17 POWDER, FOR SOLUTION ORAL 2 TIMES DAILY
Qty: 238 G | Refills: 0 | Status: SHIPPED | OUTPATIENT
Start: 2024-11-22 | End: 2024-11-29

## 2024-11-22 RX ORDER — ONDANSETRON 2 MG/ML
4 INJECTION INTRAMUSCULAR; INTRAVENOUS ONCE AS NEEDED
Status: DISCONTINUED | OUTPATIENT
Start: 2024-11-22 | End: 2024-11-22 | Stop reason: HOSPADM

## 2024-11-22 RX ORDER — HYDROCODONE BITARTRATE AND ACETAMINOPHEN 5; 325 MG/1; MG/1
1 TABLET ORAL ONCE AS NEEDED
Status: DISCONTINUED | OUTPATIENT
Start: 2024-11-22 | End: 2024-11-22 | Stop reason: HOSPADM

## 2024-11-22 RX ORDER — ONDANSETRON 4 MG/1
4 TABLET, FILM COATED ORAL EVERY 8 HOURS PRN
Qty: 10 TABLET | Refills: 0 | Status: SHIPPED | OUTPATIENT
Start: 2024-11-22

## 2024-11-22 RX ORDER — ONDANSETRON 2 MG/ML
INJECTION INTRAMUSCULAR; INTRAVENOUS AS NEEDED
Status: DISCONTINUED | OUTPATIENT
Start: 2024-11-22 | End: 2024-11-22 | Stop reason: SURG

## 2024-11-22 RX ORDER — HYDROCODONE BITARTRATE AND ACETAMINOPHEN 7.5; 325 MG/1; MG/1
2 TABLET ORAL EVERY 4 HOURS PRN
Status: DISCONTINUED | OUTPATIENT
Start: 2024-11-22 | End: 2024-11-22 | Stop reason: HOSPADM

## 2024-11-22 RX ORDER — MAGNESIUM HYDROXIDE 1200 MG/15ML
LIQUID ORAL AS NEEDED
Status: DISCONTINUED | OUTPATIENT
Start: 2024-11-22 | End: 2024-11-22 | Stop reason: HOSPADM

## 2024-11-22 RX ORDER — HYDROCODONE BITARTRATE AND ACETAMINOPHEN 7.5; 325 MG/1; MG/1
1 TABLET ORAL EVERY 4 HOURS PRN
Qty: 30 TABLET | Refills: 0 | Status: SHIPPED | OUTPATIENT
Start: 2024-11-22 | End: 2024-11-23 | Stop reason: SDUPTHER

## 2024-11-22 RX ORDER — DROPERIDOL 2.5 MG/ML
0.62 INJECTION, SOLUTION INTRAMUSCULAR; INTRAVENOUS
Status: DISCONTINUED | OUTPATIENT
Start: 2024-11-22 | End: 2024-11-22 | Stop reason: HOSPADM

## 2024-11-22 RX ORDER — ROPIVACAINE HYDROCHLORIDE 5 MG/ML
INJECTION, SOLUTION EPIDURAL; INFILTRATION; PERINEURAL
Status: COMPLETED | OUTPATIENT
Start: 2024-11-22 | End: 2024-11-22

## 2024-11-22 RX ORDER — LIDOCAINE HYDROCHLORIDE 20 MG/ML
INJECTION, SOLUTION EPIDURAL; INFILTRATION; INTRACAUDAL; PERINEURAL AS NEEDED
Status: DISCONTINUED | OUTPATIENT
Start: 2024-11-22 | End: 2024-11-22 | Stop reason: SURG

## 2024-11-22 RX ORDER — TRANEXAMIC ACID 100 MG/ML
INJECTION, SOLUTION INTRAVENOUS AS NEEDED
Status: DISCONTINUED | OUTPATIENT
Start: 2024-11-22 | End: 2024-11-22 | Stop reason: SURG

## 2024-11-22 RX ORDER — SODIUM CHLORIDE 0.9 % (FLUSH) 0.9 %
3 SYRINGE (ML) INJECTION EVERY 12 HOURS SCHEDULED
Status: DISCONTINUED | OUTPATIENT
Start: 2024-11-22 | End: 2024-11-22 | Stop reason: HOSPADM

## 2024-11-22 RX ORDER — IPRATROPIUM BROMIDE AND ALBUTEROL SULFATE 2.5; .5 MG/3ML; MG/3ML
3 SOLUTION RESPIRATORY (INHALATION) ONCE AS NEEDED
Status: DISCONTINUED | OUTPATIENT
Start: 2024-11-22 | End: 2024-11-22 | Stop reason: HOSPADM

## 2024-11-22 RX ORDER — ASPIRIN 81 MG/1
81 TABLET ORAL EVERY 12 HOURS SCHEDULED
Status: DISCONTINUED | OUTPATIENT
Start: 2024-11-23 | End: 2024-11-22 | Stop reason: HOSPADM

## 2024-11-22 RX ORDER — PANTOPRAZOLE SODIUM 40 MG/1
40 TABLET, DELAYED RELEASE ORAL DAILY
Qty: 14 TABLET | Refills: 0 | Status: SHIPPED | OUTPATIENT
Start: 2024-11-22 | End: 2024-12-06

## 2024-11-22 RX ORDER — MELOXICAM 15 MG/1
15 TABLET ORAL ONCE
Status: COMPLETED | OUTPATIENT
Start: 2024-11-22 | End: 2024-11-22

## 2024-11-22 RX ORDER — ONDANSETRON 4 MG/1
4 TABLET, ORALLY DISINTEGRATING ORAL EVERY 6 HOURS PRN
Status: DISCONTINUED | OUTPATIENT
Start: 2024-11-22 | End: 2024-11-22 | Stop reason: HOSPADM

## 2024-11-22 RX ORDER — PROMETHAZINE HYDROCHLORIDE 25 MG/1
12.5 TABLET ORAL EVERY 4 HOURS PRN
Status: DISCONTINUED | OUTPATIENT
Start: 2024-11-22 | End: 2024-11-22 | Stop reason: HOSPADM

## 2024-11-22 RX ORDER — PROMETHAZINE HYDROCHLORIDE 25 MG/1
25 TABLET ORAL ONCE AS NEEDED
Status: DISCONTINUED | OUTPATIENT
Start: 2024-11-22 | End: 2024-11-22 | Stop reason: HOSPADM

## 2024-11-22 RX ORDER — NALOXONE HCL 0.4 MG/ML
0.2 VIAL (ML) INJECTION AS NEEDED
Status: DISCONTINUED | OUTPATIENT
Start: 2024-11-22 | End: 2024-11-22 | Stop reason: HOSPADM

## 2024-11-22 RX ORDER — FENTANYL CITRATE 50 UG/ML
INJECTION, SOLUTION INTRAMUSCULAR; INTRAVENOUS AS NEEDED
Status: DISCONTINUED | OUTPATIENT
Start: 2024-11-22 | End: 2024-11-22 | Stop reason: SURG

## 2024-11-22 RX ORDER — SODIUM CHLORIDE, SODIUM LACTATE, POTASSIUM CHLORIDE, CALCIUM CHLORIDE 600; 310; 30; 20 MG/100ML; MG/100ML; MG/100ML; MG/100ML
9 INJECTION, SOLUTION INTRAVENOUS CONTINUOUS
Status: DISCONTINUED | OUTPATIENT
Start: 2024-11-22 | End: 2024-11-22 | Stop reason: HOSPADM

## 2024-11-22 RX ORDER — DEXAMETHASONE SODIUM PHOSPHATE 4 MG/ML
INJECTION, SOLUTION INTRA-ARTICULAR; INTRALESIONAL; INTRAMUSCULAR; INTRAVENOUS; SOFT TISSUE
Status: COMPLETED | OUTPATIENT
Start: 2024-11-22 | End: 2024-11-22

## 2024-11-22 RX ORDER — ROCURONIUM BROMIDE 10 MG/ML
INJECTION, SOLUTION INTRAVENOUS AS NEEDED
Status: DISCONTINUED | OUTPATIENT
Start: 2024-11-22 | End: 2024-11-22 | Stop reason: SURG

## 2024-11-22 RX ORDER — DEXAMETHASONE SODIUM PHOSPHATE 4 MG/ML
INJECTION, SOLUTION INTRA-ARTICULAR; INTRALESIONAL; INTRAMUSCULAR; INTRAVENOUS; SOFT TISSUE AS NEEDED
Status: DISCONTINUED | OUTPATIENT
Start: 2024-11-22 | End: 2024-11-22 | Stop reason: SURG

## 2024-11-22 RX ORDER — PROPOFOL 10 MG/ML
INJECTION, EMULSION INTRAVENOUS AS NEEDED
Status: DISCONTINUED | OUTPATIENT
Start: 2024-11-22 | End: 2024-11-22 | Stop reason: SURG

## 2024-11-22 RX ORDER — OXYCODONE AND ACETAMINOPHEN 7.5; 325 MG/1; MG/1
1 TABLET ORAL EVERY 4 HOURS PRN
Status: DISCONTINUED | OUTPATIENT
Start: 2024-11-22 | End: 2024-11-22 | Stop reason: HOSPADM

## 2024-11-22 RX ORDER — HYDRALAZINE HYDROCHLORIDE 20 MG/ML
5 INJECTION INTRAMUSCULAR; INTRAVENOUS
Status: DISCONTINUED | OUTPATIENT
Start: 2024-11-22 | End: 2024-11-22 | Stop reason: HOSPADM

## 2024-11-22 RX ORDER — GLYCOPYRROLATE 0.2 MG/ML
INJECTION INTRAMUSCULAR; INTRAVENOUS AS NEEDED
Status: DISCONTINUED | OUTPATIENT
Start: 2024-11-22 | End: 2024-11-22 | Stop reason: SURG

## 2024-11-22 RX ORDER — FENTANYL CITRATE 50 UG/ML
50 INJECTION, SOLUTION INTRAMUSCULAR; INTRAVENOUS ONCE AS NEEDED
Status: COMPLETED | OUTPATIENT
Start: 2024-11-22 | End: 2024-11-22

## 2024-11-22 RX ORDER — PREGABALIN 75 MG/1
150 CAPSULE ORAL ONCE
Status: COMPLETED | OUTPATIENT
Start: 2024-11-22 | End: 2024-11-22

## 2024-11-22 RX ORDER — LABETALOL HYDROCHLORIDE 5 MG/ML
5 INJECTION, SOLUTION INTRAVENOUS
Status: DISCONTINUED | OUTPATIENT
Start: 2024-11-22 | End: 2024-11-22 | Stop reason: HOSPADM

## 2024-11-22 RX ORDER — DIPHENHYDRAMINE HYDROCHLORIDE 50 MG/ML
12.5 INJECTION INTRAMUSCULAR; INTRAVENOUS
Status: DISCONTINUED | OUTPATIENT
Start: 2024-11-22 | End: 2024-11-22 | Stop reason: HOSPADM

## 2024-11-22 RX ORDER — MAGNESIUM SULFATE HEPTAHYDRATE 500 MG/ML
INJECTION, SOLUTION INTRAMUSCULAR; INTRAVENOUS AS NEEDED
Status: DISCONTINUED | OUTPATIENT
Start: 2024-11-22 | End: 2024-11-22 | Stop reason: SURG

## 2024-11-22 RX ORDER — ASPIRIN 81 MG/1
TABLET ORAL
Qty: 60 TABLET | Refills: 0 | Status: SHIPPED | OUTPATIENT
Start: 2024-11-22 | End: 2025-01-07

## 2024-11-22 RX ORDER — FLUMAZENIL 0.1 MG/ML
0.2 INJECTION INTRAVENOUS AS NEEDED
Status: DISCONTINUED | OUTPATIENT
Start: 2024-11-22 | End: 2024-11-22 | Stop reason: HOSPADM

## 2024-11-22 RX ORDER — EPHEDRINE SULFATE 50 MG/ML
5 INJECTION, SOLUTION INTRAVENOUS ONCE AS NEEDED
Status: DISCONTINUED | OUTPATIENT
Start: 2024-11-22 | End: 2024-11-22 | Stop reason: HOSPADM

## 2024-11-22 RX ORDER — SODIUM CHLORIDE 0.9 % (FLUSH) 0.9 %
3-10 SYRINGE (ML) INJECTION AS NEEDED
Status: DISCONTINUED | OUTPATIENT
Start: 2024-11-22 | End: 2024-11-22 | Stop reason: HOSPADM

## 2024-11-22 RX ORDER — HYDROCODONE BITARTRATE AND ACETAMINOPHEN 7.5; 325 MG/1; MG/1
1 TABLET ORAL EVERY 4 HOURS PRN
Status: DISCONTINUED | OUTPATIENT
Start: 2024-11-22 | End: 2024-11-22 | Stop reason: HOSPADM

## 2024-11-22 RX ORDER — HYDROMORPHONE HYDROCHLORIDE 1 MG/ML
0.5 INJECTION, SOLUTION INTRAMUSCULAR; INTRAVENOUS; SUBCUTANEOUS
Status: DISCONTINUED | OUTPATIENT
Start: 2024-11-22 | End: 2024-11-22 | Stop reason: HOSPADM

## 2024-11-22 RX ADMIN — TRANEXAMIC ACID 1000 MG: 100 INJECTION, SOLUTION INTRAVENOUS at 11:38

## 2024-11-22 RX ADMIN — FENTANYL CITRATE 25 MCG: 50 INJECTION, SOLUTION INTRAMUSCULAR; INTRAVENOUS at 11:10

## 2024-11-22 RX ADMIN — HYDROCODONE BITARTRATE AND ACETAMINOPHEN 1 TABLET: 7.5; 325 TABLET ORAL at 12:32

## 2024-11-22 RX ADMIN — DEXAMETHASONE SODIUM PHOSPHATE 8 MG: 4 INJECTION, SOLUTION INTRAMUSCULAR; INTRAVENOUS at 10:39

## 2024-11-22 RX ADMIN — MELOXICAM 15 MG: 15 TABLET ORAL at 08:46

## 2024-11-22 RX ADMIN — LIDOCAINE HYDROCHLORIDE 0.5 ML: 10 INJECTION, SOLUTION EPIDURAL; INFILTRATION; INTRACAUDAL; PERINEURAL at 08:37

## 2024-11-22 RX ADMIN — DEXAMETHASONE SODIUM PHOSPHATE 4 MG: 4 INJECTION, SOLUTION INTRA-ARTICULAR; INTRALESIONAL; INTRAMUSCULAR; INTRAVENOUS; SOFT TISSUE at 10:15

## 2024-11-22 RX ADMIN — ACETAMINOPHEN 1000 MG: 1000 INJECTION INTRAVENOUS at 10:45

## 2024-11-22 RX ADMIN — GLYCOPYRROLATE 0.1 MG: 0.2 INJECTION INTRAMUSCULAR; INTRAVENOUS at 10:55

## 2024-11-22 RX ADMIN — HYDROMORPHONE HYDROCHLORIDE 0.5 MG: 1 INJECTION, SOLUTION INTRAMUSCULAR; INTRAVENOUS; SUBCUTANEOUS at 12:13

## 2024-11-22 RX ADMIN — FENTANYL CITRATE 25 MCG: 50 INJECTION, SOLUTION INTRAMUSCULAR; INTRAVENOUS at 11:17

## 2024-11-22 RX ADMIN — ROCURONIUM BROMIDE 50 MG: 10 INJECTION, SOLUTION INTRAVENOUS at 10:33

## 2024-11-22 RX ADMIN — LIDOCAINE HYDROCHLORIDE 80 MG: 20 INJECTION, SOLUTION EPIDURAL; INFILTRATION; INTRACAUDAL; PERINEURAL at 10:33

## 2024-11-22 RX ADMIN — ROPIVACAINE HYDROCHLORIDE 15 ML: 5 INJECTION EPIDURAL; INFILTRATION; PERINEURAL at 10:15

## 2024-11-22 RX ADMIN — SODIUM CHLORIDE, POTASSIUM CHLORIDE, SODIUM LACTATE AND CALCIUM CHLORIDE 9 ML/HR: 600; 310; 30; 20 INJECTION, SOLUTION INTRAVENOUS at 08:37

## 2024-11-22 RX ADMIN — FENTANYL CITRATE 50 MCG: 50 INJECTION, SOLUTION INTRAMUSCULAR; INTRAVENOUS at 12:33

## 2024-11-22 RX ADMIN — FENTANYL CITRATE 50 MCG: 50 INJECTION, SOLUTION INTRAMUSCULAR; INTRAVENOUS at 10:09

## 2024-11-22 RX ADMIN — Medication 20 MG: at 11:10

## 2024-11-22 RX ADMIN — SODIUM CHLORIDE 1000 MG: 9 INJECTION, SOLUTION INTRAVENOUS at 09:34

## 2024-11-22 RX ADMIN — PREGABALIN 150 MG: 75 CAPSULE ORAL at 08:46

## 2024-11-22 RX ADMIN — PROPOFOL 150 MG: 10 INJECTION, EMULSION INTRAVENOUS at 10:33

## 2024-11-22 RX ADMIN — PROPOFOL 160 MCG/KG/MIN: 10 INJECTION, EMULSION INTRAVENOUS at 10:35

## 2024-11-22 RX ADMIN — MAGNESIUM SULFATE HEPTAHYDRATE 2 G: 500 INJECTION, SOLUTION INTRAMUSCULAR; INTRAVENOUS at 11:17

## 2024-11-22 RX ADMIN — FENTANYL CITRATE 50 MCG: 50 INJECTION, SOLUTION INTRAMUSCULAR; INTRAVENOUS at 11:30

## 2024-11-22 RX ADMIN — Medication 10 MG: at 11:40

## 2024-11-22 RX ADMIN — SUGAMMADEX 200 MG: 100 INJECTION, SOLUTION INTRAVENOUS at 12:07

## 2024-11-22 RX ADMIN — ONDANSETRON 4 MG: 2 INJECTION INTRAMUSCULAR; INTRAVENOUS at 10:39

## 2024-11-22 RX ADMIN — SODIUM CHLORIDE 2 G: 900 INJECTION INTRAVENOUS at 10:19

## 2024-11-22 NOTE — THERAPY DISCHARGE NOTE
Patient Name: Moriah Morris  : 1955    MRN: [id number]                              Today's Date: 2024       Admit Date: 2024    Visit Dx:     ICD-10-CM ICD-9-CM   1. S/P knee surgery  Z98.890 V45.89   2. Primary osteoarthritis of left knee  M17.12 715.16     Patient Active Problem List   Diagnosis    Knee pain    Pes anserinus bursitis    Arthritis of left knee    Early dry stage nonexudative age-related macular degeneration of both eyes    Bilateral nonexudative age-related macular degeneration    Artificial lens present    Primary osteoarthritis of left knee     Past Medical History:   Diagnosis Date    Arthritis     Cataract     Cataract surgery both eyes 2019    Fracture of wrist Unknown    Right hand    Fracture, foot 2015    Knee swelling Unknown    Both knees    Neuroma of foot Unknown    Left foot    Tear of meniscus of knee Unknown    Left knee    Tennis elbow Unknown    Right arm     Past Surgical History:   Procedure Laterality Date    ANKLE ARTHROSCOPY Left     CATARACT EXTRACTION, BILATERAL      CHOLECYSTECTOMY      COLONOSCOPY      FOOT SURGERY Left     FOR FOOT FRACTURE    KNEE MENISCECTOMY Left     KNEE SURGERY  Unknown    Meniscus repair-left knee      General Information       Row Name 24 1545          Physical Therapy Time and Intention    Document Type discharge evaluation/summary  Pt. is s/p Left TKR  -MS     Mode of Treatment physical therapy;individual therapy  -MS       Row Name 24 1547          General Information    Patient Profile Reviewed yes  -MS     Prior Level of Function independent:  -MS     Barriers to Rehab none identified  -MS       Row Name 24 1544          Cognition    Orientation Status (Cognition) oriented x 3  -MS       Row Name 24 1542          Safety Issues/Impairments Affecting Functional Mobility    Comment, Safety Issues/Impairments (Mobility) Gait belt used for safety.  -MS               User Key  (r) = Recorded By, (t) =  Taken By, (c) = Cosigned By      Initials Name Provider Type    Ishmael Davidson, PT Physical Therapist                   Mobility       Row Name 11/22/24 1546          Bed Mobility    Comment, (Bed Mobility) Up in chair this AM.  -MS       Row Name 11/22/24 1546          Sit-Stand Transfer    Sit-Stand Glenn (Transfers) independent  -MS     Assistive Device (Sit-Stand Transfers) walker, front-wheeled  -MS       Row Name 11/22/24 1546          Gait/Stairs (Locomotion)    Glenn Level (Gait) standby assist  -MS     Assistive Device (Gait) walker, front-wheeled  -MS     Distance in Feet (Gait) 100  -MS     Glenn Level (Stairs) stand by assist  -MS     Handrail Location (Stairs) right side (ascending)  -MS     Number of Steps (Stairs) 3  -MS     Ascending Technique (Stairs) step-to-step  -MS     Descending Technique (Stairs) step-to-step  -MS               User Key  (r) = Recorded By, (t) = Taken By, (c) = Cosigned By      Initials Name Provider Type    Ishmael Davidson, PT Physical Therapist                   Obj/Interventions       Row Name 11/22/24 1546          Range of Motion Comprehensive    Comment, General Range of Motion BUE/RLE (WFL's)  -MS       Row Name 11/22/24 1546          Strength Comprehensive (MMT)    Comment, General Manual Muscle Testing (MMT) Assessment BUE/RLE (>/=3/5)  -MS       Row Name 11/22/24 1546          Motor Skills    Therapeutic Exercise --  Left TKR ther. ex. program x 10 reps completed  -MS               User Key  (r) = Recorded By, (t) = Taken By, (c) = Cosigned By      Initials Name Provider Type    Ishmael Davidson, PT Physical Therapist                   Goals/Plan    No documentation.                  Clinical Impression       Row Name 11/22/24 1547          Pain    Pretreatment Pain Rating 4/10  -MS     Posttreatment Pain Rating 4/10  -MS     Pain Location knee  -MS     Pain Side/Orientation left  -MS     Pain Management Interventions nursing  notified;premedicated for activity;positioning techniques utilized  -MS     Pain Intervention(s) Medication (See MAR);Nursing Notified;Cold pack;Elevated  -MS       Row Name 11/22/24 1547          Plan of Care Review    Plan of Care Reviewed With patient;family  -MS       Row Name 11/22/24 1547          Positioning and Restraints    Pre-Treatment Position sitting in chair/recliner  -MS     Post Treatment Position chair  -MS     In Chair notified nsg;reclined;sitting;call light within reach;encouraged to call for assist;with family/caregiver;with nsg  -MS               User Key  (r) = Recorded By, (t) = Taken By, (c) = Cosigned By      Initials Name Provider Type    MS Ishmael Shelton, PT Physical Therapist                   Outcome Measures       Row Name 11/22/24 1547          How much help from another person do you currently need...    Turning from your back to your side while in flat bed without using bedrails? 4  -MS     Moving from lying on back to sitting on the side of a flat bed without bedrails? 4  -MS     Moving to and from a bed to a chair (including a wheelchair)? 4  -MS     Standing up from a chair using your arms (e.g., wheelchair, bedside chair)? 4  -MS     Climbing 3-5 steps with a railing? 3  -MS     To walk in hospital room? 3  -MS     AM-PAC 6 Clicks Score (PT) 22  -MS     Highest Level of Mobility Goal 7 --> Walk 25 feet or more  -MS       Row Name 11/22/24 1547          Functional Assessment    Outcome Measure Options AM-PAC 6 Clicks Basic Mobility (PT)  -MS               User Key  (r) = Recorded By, (t) = Taken By, (c) = Cosigned By      Initials Name Provider Type    Ishmael Davidson, PT Physical Therapist                  Physical Therapy Education       Title: PT OT SLP Therapies (Resolved)       Topic: Physical Therapy (Resolved)       Point: Mobility training (Resolved)       Learning Progress Summary            Patient Acceptance, E,D, SEGUNDO,DU by MS at 11/22/2024 5431                       Point: Home exercise program (Resolved)       Learning Progress Summary            Patient Acceptance, E,D, VU,DU by MS at 11/22/2024 1548                      Point: Body mechanics (Resolved)       Learning Progress Summary            Patient Acceptance, E,D, VU,DU by MS at 11/22/2024 1548                      Point: Precautions (Resolved)       Learning Progress Summary            Patient Acceptance, E,D, VU,DU by MS at 11/22/2024 1548                                      User Key       Initials Effective Dates Name Provider Type Discipline    MS 06/16/21 -  Ishmael Shelton, PT Physical Therapist PT                  PT Recommendation and Plan           Time Calculation:         PT Charges       Row Name 11/22/24 1548             Time Calculation    Start Time 1505  -MS      Stop Time 1520  -MS      Time Calculation (min) 15 min  -MS      PT Received On 11/22/24  -MS         Time Calculation- PT    Total Timed Code Minutes- PT 14 minute(s)  -MS                User Key  (r) = Recorded By, (t) = Taken By, (c) = Cosigned By      Initials Name Provider Type    MS Ishmael Shelton, PT Physical Therapist                  Therapy Charges for Today       Code Description Service Date Service Provider Modifiers Qty    34936250156 HC PT EVAL LOW COMPLEXITY 2 11/22/2024 Ishmael Shelton, PT GP 1    30072686208 HC PT THER PROC EA 15 MIN 11/22/2024 Ishmael Shelton, PT GP 1            PT G-Codes  Outcome Measure Options: AM-PAC 6 Clicks Basic Mobility (PT)  AM-PAC 6 Clicks Score (PT): 22    PT Discharge Summary  Anticipated Discharge Disposition (PT): home with assist, home with home health  Reason for Discharge: Discharge from facility  Discharge Destination: Home with assist, Home with home health    Ishmael Shelton, PT  11/22/2024

## 2024-11-22 NOTE — OP NOTE
Name: Moriah Morris    YOB: 1955    DATE OF SURGERY: 11/22/2024    PREOPERATIVE DIAGNOSIS: Left knee end-stage osteoarthritis    POSTOPERATIVE DIAGNOSIS: Left knee end-stage osteoarthritis    PROCEDURE PERFORMED: Left total knee replacement     SURGEON: Jason Minaya M.D.    ASSISTANT: MONTSERRAT WHITTINGTON    A surgical assistant was integral in ensuring a successful outcome with this procedure.  The assistant was utilized to assist in positioning the patient, draping the patient, was used throughout the case to provide with retraction of tissues, suctioning of blood and body fluids for visualization, positioning of the extremity to allow for proper exposure so that I could perform the procedure.  Without the use of a surgical assistant during this procedure I feel that the outcome may have been compromised or would have been suboptimal or at risk for complications.    IMPLANTS: [employer]:     Implant Name Type Inv. Item Serial No.  Lot No. LRB No. Used Action   CMT BONE PALACOS R HI/VISC 1X40 - [id number] Implant CMT BONE PALACOS R HI/VISC 1X40  [facility] [id number] Left 1 Implanted   DEV CONTRL TISS STRATAFIX SYMM PDS PLUS INDIO CT-1 60CM - [id number] Implant DEV CONTRL TISS STRATAFIX SYMM PDS PLUS INDIO CT-1 60CM  [employer]  DIV [employer] J AND J [id number] Left 1 Implanted   DEV CONTRL TISS STRATAFIXSPIRALMNCRYL PLSPS2 REV3/0 45CM - [id number] Implant DEV CONTRL TISS STRATAFIXSPIRALMNCRYL PLSPS2 REV3/0 45CM  [employer]  DIV OF J AND J [id number] Left 1 Implanted   COMP FEM LEGION OXINIUM CR NRW SZ4 LT - [id number] Implant COMP FEM LEGION OXINIUM CR NRW SZ4 LT  [employer] [id number] Left 1 Implanted   BASE TIB/KN GEN2 NONPOR TI SZ2 LT - [id number] Implant BASE TIB/KN GEN2 NONPOR TI SZ2 LT  [employer] [id number] Left 1 Implanted   PAT GEN2 BICONVEX [id number] - [id number] Implant PAT GEN2 BICONVEX [id number]  [employer] [id number] Left 1 Implanted   INSRT ART/KN LEGION CR HF XLPE SZ1TO2  9MM - [id number] Implant INSRT ART/KN LEGION CR HF XLPE SZ1TO2 9MM  [employer] [id number] Left 1 Implanted       Estimated Blood Loss: 200cc  Specimens : none  Complications: none    DESCRIPTION OF PROCEDURE: The patient was taken to the operating room and placed in the supine position. A sequential compression device was carefully placed on the non-operative leg. Preoperative antibiotics were administered. Surgical time out was performed. After adequate induction of anesthesia, the leg was prepped and draped in the usual sterile fashion, exsanguinated with an Esmarch bandage and the tourniquet inflated to 250 mmHg. A midline incision was performed followed by a medial parapatellar arthrotomy. The patella was subluxed laterally.  A portion of the fat pad, ACL, and anterior horns of the meniscus were excised.  A drill hole was then placed in the center of the femoral canal in line with the canal.  It was irrigated and suctioned.  The intramedullary bridgette was then placed and a 5 degree distal valgus cut was performed after the block pinned in place appropriately.  Cut surface was then removed.  The sizing and rotation guide was then placed and seated appropriately.  It was sized and then the drill holes for the 4-in-1 cutting guide were placed in 3 degrees of external rotation based off of the posterior condyles.  The 4-in-1 cutting block was then placed and the femoral cuts were performed.  The excess bone was removed and the cut surfaces looked good.  At this point we placed the retractors around the proximal tibia and a slight release of the PCL fibers off of the posterior proximal tibia was performed.  We used the extramedullary tibial alignment guide and it was aligned appropriately and then the depth was set and the block pinned in place.  The tibial cut was then performed and the alignment guide was removed.  The tibial cut was removed and the cut surface looked good.  The posterior horns of the menisci were  then removed as well as the posterior osteophytes.  Flexion extension blocks were then used to check the balance of the knee. The tibial cut surface was then sized with the sizing templates and the tibial and femoral trial were then placed. The knee was placed in full extension and then the tibial tray rotation was then matched to the femoral rotation and marked.    Attention was then placed to the patella. The patella was noted to track centrally through range of motion. The patella was then sized with the trials. The thickness of the patella was then measured. The patella was resurfaced and the surrounding osteophytes were removed. The preoperative thickness was reproduced. The patella tracked centrally through range of motion.  We then checked the balance with the trial implants in place and there was excellent medial lateral and flexion-extension balance.   At this point all trial components were removed, the knee was copiously irrigated with pulsed lavage, and the knee was injected with anesthetic cocktail solution. The cut surfaces were then dried with clean lap sponges, and the components were cemented tibia, followed by femur, then patella. The knee was held in full extension and all excess cement was removed. The knee was held still until the cement had completely hardened. We then placed the trial polyethylene spacer which resulted in full extension and excellent flexion-extension balance. We placed the final polyethylene spacer.   The knee was then copiously irrigated. The tourniquet was then released. There was excellent hemostasis. We placed a one-eighth inch Hemovac drain. We closed the knee in multiple layers in standard fashion. Sterile dressing were applied. At the end of the case, the sponge and needle counts were reported as being correct. There were no known complications. The patient was then transported to the recovery room.      Jason Minaya M.D.

## 2024-11-22 NOTE — ANESTHESIA PREPROCEDURE EVALUATION
Anesthesia Evaluation     Patient summary reviewed and Nursing notes reviewed   no history of anesthetic complications:   NPO Solid Status: > 8 hours  NPO Liquid Status: > 2 hours           Airway   Mallampati: II  TM distance: >3 FB  Neck ROM: full  Dental      Pulmonary    Cardiovascular     ECG reviewed      ROS comment: EKG normal    Neuro/Psych  GI/Hepatic/Renal/Endo    (+) obesity    Musculoskeletal     Abdominal   (+) obese   Substance History      OB/GYN          Other   arthritis,                       Anesthesia Plan    ASA 2     general with block   total IV anesthesia  intravenous induction     Anesthetic plan, risks, benefits, and alternatives have been provided, discussed and informed consent has been obtained with: patient.        CODE STATUS:

## 2024-11-22 NOTE — ANESTHESIA PROCEDURE NOTES
Peripheral Block    Pre-sedation assessment completed: 11/22/2024 9:30 AM    Patient reassessed immediately prior to procedure    Patient location during procedure: pre-op  Start time: 11/22/2024 10:13 AM  Stop time: 11/22/2024 10:15 AM  Reason for block: at surgeon's request and post-op pain management  Performed by  Anesthesiologist: Ridge Chandler MD  Preanesthetic Checklist  Completed: patient identified, IV checked, site marked, risks and benefits discussed, surgical consent, monitors and equipment checked, pre-op evaluation and timeout performed  Prep:  Pt Position: supine  Sterile barriers:cap, mask and washed/disinfected hands  Prep: ChloraPrep  Patient monitoring: blood pressure monitoring, continuous pulse oximetry and EKG  Procedure    Sedation: yes  Performed under: local infiltration  Guidance:ultrasound guided    ULTRASOUND INTERPRETATION.  Using ultrasound guidance a gauge needle was placed in close proximity to the femoral nerve, at which point, under ultrasound guidance anesthetic was injected in the area of the nerve and spread of the anesthesia was seen on ultrasound in close proximity thereto.  There were no abnormalities seen on ultrasound; a digital image was taken; and the patient tolerated the procedure with no complications. Images:still images obtained, printed/placed on chart    Laterality:left  Block Type:adductor canal block  Injection Technique:single-shot  Needle Type:echogenic  Needle Gauge:21 G  Resistance on Injection: none    Medications Used: dexamethasone (DECADRON) injection - Injection   4 mg - 11/22/2024 10:15:00 AM  ropivacaine (NAROPIN) 0.5 % injection - Injection   15 mL - 11/22/2024 10:15:00 AM      Post Assessment  Injection Assessment: negative aspiration for heme, no paresthesia on injection and incremental injection  Patient Tolerance:comfortable throughout block  Complications:no  Additional Notes  Ultrasound guidance used to visualize nerve anatomy, guide needle  placement and verify local anesthetic disbursement.     Performed by: Ridge Chandler MD

## 2024-11-22 NOTE — CASE MANAGEMENT/SOCIAL WORK
Continued Stay Note  [facility]     Patient Name: Moriah Morris  MRN: [id number]  Today's Date: 11/22/2024    Admit Date: 11/22/2024    Plan: Home with [facility]    Discharge Plan       Row Name 11/22/24 1258       Plan    Plan Home with [facility]     Patient/Family in Agreement with Plan yes    Provided Post Acute Provider List? Yes    Post Acute Provider List Home Health    Delivered To Patient    Method of Delivery Telephone                   Discharge Codes    No documentation.                 Expected Discharge Date and Time       Expected Discharge Date Expected Discharge Time    Nov 22, 2024               Shannon Epley, RN

## 2024-11-22 NOTE — DISCHARGE INSTRUCTIONS
What to expect after a Nerve Block    Nerve blocks administered to block pain affect many types of nerves, including those nerves that control movement, pain, and normal sensation. Following a nerve block, you may notice some bruising at the site where the block was given. You may experience sensations such as: numbness of the affected area or limb, tingling, heaviness (that is the limb feels heavy to you), weakness or inability to move the affected arm or leg, or a feeling as if your arm or leg has “fallen asleep.”     A nerve block can last from 2 to 36 hours depending on the medications used.  Usually the weakness wears off first followed by the tingling and heaviness. As the block wears off, you may begin to notice pain; however, this sequence of events may occur in any order. Typically, you will be able to move your limb before you will feel it. Once a nerve block begins to wear off, the effects are usually completely gone within 60 minutes.  If you experience continued side effects that you believe are block related for longer than 48 hours, please call your healthcare provider. Please see block-specific instructions below.    Instructions for any Block involving the leg/foot:   If you have had a leg /foot block, you should not bear weight on the affected leg until the block has worn off. After the block has worn off, weight bearing should be as directed by your surgeon. You may be sent home with crutches. You are at high risk for falling because of the anesthetic effects on your leg. Please use caution when standing or trying to move or walk. Have someone assist you until your leg and foot function have returned to normal.     Protection of a “blocked” limb  After a nerve block, you cannot feel pain, pressure, or extremes of temperature in the affected limb. And because of this, your blocked limb is at more risk for injury. For example, it is possible to burn your limb on an extremely hot surface  without feeling it.     When resting, it is important to reposition your limb periodically to avoid prolonged pressure on it. This may require the use of pillows and padding.    While sleeping, you should avoid rolling onto the affected limb or putting too much pressure on it.     If you have a cast or tight dressing, check the color of your fingers or toes of the affected limb. Call your surgeon if they look discolored (that is, dusky, dark colored).    Use caution in cold weather. Cover your limb appropriately to protect it from the cold.    Pain Management:  Your surgeon will give you a prescription for pain medication. Begin taking this before the nerve block wears off. Bear in mind that sometimes the block can wear off in the middle of the night.

## 2024-11-22 NOTE — ANESTHESIA POSTPROCEDURE EVALUATION
Patient: Moriah Morris    Procedure Summary       Date: 11/22/24 Room / Location: [facility] OR [address] [place] [address] [facility]    Anesthesia Start: 1025 Anesthesia Stop: 1220    Procedure: TOTAL KNEE ARTHROPLASTY (Left: Knee) Diagnosis:       Primary osteoarthritis of left knee      (Primary osteoarthritis of left knee [M17.12])    Surgeons: Jason Minaya MD Provider: Ridge Chandler MD    Anesthesia Type: general with block ASA Status: 2            Anesthesia Type: general with block    Vitals  Vitals Value Taken Time   /80 11/22/24 1230   Temp 36.6 °C (97.8 °F) 11/22/24 1223   Pulse 89 11/22/24 1230   Resp 15 11/22/24 1225   SpO2 97 % 11/22/24 1230   Vitals shown include unfiled device data.        Anesthesia Post Evaluation

## 2024-11-22 NOTE — PROGRESS NOTES
Patient is discharging today. Sister is agreeable to home health and has no current home health. Face sheet information is correct and orders for home health PT in [facility].Thank you !

## 2024-11-22 NOTE — DISCHARGE PLACEMENT REQUEST
"Moriah Morris (69 y.o. Female)       Date of Birth   1955    Social Security Number       Address   [address] [address]    Home Phone   [phone]    MRN   [id number]       [employer]   [facility]    Marital Status   Single                            Admission Date   11/22/24    Admission Type   Elective    Admitting Provider   Jason Minaya MD    Attending Provider   Jason Minaya MD    Department, Room/Bed   [facility] OR, [facility] OR       Discharge Date       Discharge Disposition       Discharge Destination                                 Attending Provider: Jason Minaya MD    Allergies: No Known Allergies    Isolation: None   Infection: None   Code Status: Not on file    Ht: 152.4 cm (60\")   Wt: 74.8 kg (165 lb)    Admission Cmt: None   Principal Problem: Primary osteoarthritis of left knee [M17.12]                   Active Insurance as of 11/22/2024       Primary Coverage       Payor Plan Insurance Group Employer/Plan Group    MEDICARE MEDICARE A & B        Payor Plan Address Payor Plan Phone Number Payor Plan Fax Number Effective Dates    [address] [phone]  6/1/2019 - None Entered    [facility] [id number]         Subscriber Name Subscriber Birth Date Member ID       MORIAH MORRIS 1955 [id number]               Secondary Coverage       Payor Plan Insurance Group Employer/Plan Group    [employer] [place] [employer] [place]        Payor Plan Address Payor Plan Phone Number Payor Plan Fax Number Effective Dates    [address] [place] [address] [phone]  6/1/2019 - None Entered    [place] [address]         Subscriber Name Subscriber Birth Date Member ID       MORIAH MORRIS 1955 [id number]                     Emergency Contacts        (Rel.) Home Phone Work Phone Mobile Phone    Araceli Gonzalez (Sister) [phone] -- [phone]              "

## 2024-11-22 NOTE — ANESTHESIA PROCEDURE NOTES
Airway  Urgency: elective    Date/Time: 11/22/2024 10:36 AM  Airway not difficult    General Information and Staff    Patient location during procedure: OR  CRNA/CAA: Tucker Sommers CRNA    Indications and Patient Condition  Indications for airway management: airway protection    Preoxygenated: yes  Mask difficulty assessment: 2 - vent by mask + OA or adjuvant +/- NMBA    Final Airway Details  Final airway type: endotracheal airway      Successful airway: ETT  Cuffed: yes   Successful intubation technique: direct laryngoscopy  Facilitating devices/methods: intubating stylet  Endotracheal tube insertion site: oral  Blade: Johnnie  Blade size: 3  ETT size (mm): 7.0  Cormack-Lehane Classification: grade I - full view of glottis  Placement verified by: chest auscultation and capnometry   Measured from: teeth  ETT/EBT  to teeth (cm): 20  Number of attempts at approach: 1  Assessment: lips, teeth, and gum same as pre-op and atraumatic intubation

## 2024-11-23 ENCOUNTER — HOME CARE VISIT (OUTPATIENT)
Dept: HOME HEALTH SERVICES | Facility: HOME HEALTHCARE | Age: 69
End: 2024-11-23
Payer: MEDICARE

## 2024-11-23 VITALS
DIASTOLIC BLOOD PRESSURE: 88 MMHG | TEMPERATURE: 97.1 F | OXYGEN SATURATION: 99 % | HEART RATE: 62 BPM | RESPIRATION RATE: 17 BRPM | SYSTOLIC BLOOD PRESSURE: 110 MMHG

## 2024-11-23 DIAGNOSIS — Z98.890 S/P KNEE SURGERY: ICD-10-CM

## 2024-11-23 PROCEDURE — G0151 HHCP-SERV OF PT,EA 15 MIN: HCPCS

## 2024-11-23 NOTE — Clinical Note
Patient admitted to [facility] PT services: 1 week 1, 3 week 1; 2 week 1 for ther-ex instruction, gait training, pain/edema management, transfer training following recent TKA

## 2024-11-23 NOTE — HOME HEALTH
"REASON FOR REFERRAL 69 year old   female  presents with complaints of :   L knee pain and difficulty walking following recent L TKA    Home health ordered for: PT    SUBJECTIVE: \" The pain really isn't too bad so far\"    DIAGNOSIS/FOCUS OF CARE:  L TKA on 11/22 Dr Minaya       PERTINENT MEDICAL HISTORY:  Pes anserinus bursitis    Arthritis of left knee    Early dry stage nonexudative age-related macular degeneration of both eyes    Bilateral nonexudative age-related macular degeneration- Artificial lens present    Primary osteoarthritis of left knee   neuroma of L foot  fracture of wrist/R hand      PRIOR LEVEL OF FUNCTION: patient was independent with self care and mobility prior to recent surgery ; was a cycler and     PATIENT PHYSICAL THERAPY GOAL(S): \"I want to get back to cycling and playing tennis\"      SOCIAL ENVIRONMENT/ DME /POTENTIAL BARRIERS FOR GOAL ATTAINMENT : rom-tech, RW, 3 steps to enter with B handrail; walk in shower; grab bars; elevated toilet;  lives with sister in home with basement with laundry    SKIN INTEGRITY/ WOUND STATUS:  see wound care screen for details; DARRELL operable and present L anterior knee    CODE STATUS:  full    MEDICATION ISSUES/ CONCERNS:  none identified    HOMEBOUND STATUS: yes - see homebound screen for details    PROBLEMS IDENTIFIED: see care plan    FUNCTIONAL STATUS/ FALL RISK/ SAFETY: see physical therapy evaluation/ care plan     ASSESSMENT: patient able to demonstrate min A/Supervision with most transfers and ambulation however block still apparently functioning.  Anticipate increased pain /edema over upcoming days.  lives with supportive sister and at baseline was independent and riding long distances biking in community. anticipate patient to do well with compliance with therapy instructions   ___________________    PLAN FOR NEXT VISIT:   MEDICAL NECESSITY FOR ONGOING SKILLED THERAPY:  Skilled physical therapy is medically necessary for treatment of: " gait, transfer, ROM, strength and balance deficits following recent hospitalization for L TKA   .Requires instruction in appropriate progression of exercises; education in fall prevention/pain/edema management; gait training to reduce reliance on assistive device; balance retraining to prevent falls.  Without skilled physical therapy, patient at risk for: falls, rehospitalization, increased reliance on caregiver, chronic pain,       SPECIFIC INTERVENTIONS AND GOALS TO ADDRESS ON NEXT VISIT:  - progress HEP - include standing exercises when appropriate  - gait training to restore normal mechanics  - fall prevention education  - continued home safety education  - increase AROM greater than 19-81 degrees L knee  - transfer training    FREQUENCY AND DURATION:  - 1 week 1; 3 week 1; 2 week 1    ANY OTHER FOLLOW UP NEEDED: none    DATE OF NEXT APPOINTMENT WITH DOCTOR: follow up on 12/5 with Dr Minaya; Bryson Paulino to be scheduled for outpatient      REASSESSMENT DUE DATE: 30 day:  12/22/24     Dr. Minaya electronically notified of POC via case communication on   11/23/24

## 2024-11-24 ENCOUNTER — HOME CARE VISIT (OUTPATIENT)
Dept: HOME HEALTH SERVICES | Facility: HOME HEALTHCARE | Age: 69
End: 2024-11-24
Payer: MEDICARE

## 2024-11-24 VITALS
TEMPERATURE: 98 F | DIASTOLIC BLOOD PRESSURE: 86 MMHG | OXYGEN SATURATION: 99 % | RESPIRATION RATE: 18 BRPM | HEART RATE: 65 BPM | SYSTOLIC BLOOD PRESSURE: 114 MMHG

## 2024-11-24 PROCEDURE — G0151 HHCP-SERV OF PT,EA 15 MIN: HCPCS

## 2024-11-24 NOTE — HOME HEALTH
"SUBJECTIVE: \"the pain really kicked in after therapy yesterday. I couldn't do all the rest of my exercises last night.\" 'The pain kept me up all night so I didn't sleep well at all\"  .  Reports was able to have successful bowl movement today    No new med changes.  No recent Falls.      ASSESSMENT: as expected, pain and edema increased over prior day visit. nerve block appears to have worn off and patient with increased difficulty with mobility. Painful active knee extension and passive knee flexion. would benefit from increased frequency of activity today with reduced emphasis on repetitions.  good/excellent compliance with PT recommendations. anticipate patient to continue to make positive progress with recovery      SKILL/EDUCATION PROVIDED: see interventions for details  PATIENT/CAREGIVER RESPONSE: see interventions for details    ____________    PLAN FOR NEXT VISIT:   MEDICAL NECESSITY FOR ONGOING SKILLED THERAPY: Skilled physical therapy is medically necessary for treatment of: gait, transfer, ROM, strength and balance deficits following recent hospitalization for L TKA .Requires instruction in appropriate progression of exercises; education in fall prevention/pain/edema management; gait training to reduce reliance on assistive device; balance retraining to prevent falls. Without skilled physical therapy, patient at risk for: falls, rehospitalization, increased reliance on caregiver, chronic pain,     SPECIFIC INTERVENTIONS AND GOALS TO ADDRESS ON NEXT VISIT:   - progress HEP  ; initiate standing exercises  - gait training to restore normal mechanics - transition to SPC once appropriate  - fall prevention education   - increase AROM greater than 27-86 degrees L knee   - transfer training to increase L LE weight bearing    FREQUENCY AND DURATION:   - 1 week 1; 3 week 1; 2 week 1     ANY OTHER FOLLOW UP NEEDED: none     DATE OF NEXT APPOINTMENT WITH DOCTOR: follow up on 12/5 with Dr Minaya; Bryson Paulino to " be scheduled for outpatient     REASSESSMENT DUE DATE: 30 day: 12/22/24

## 2024-11-25 ENCOUNTER — TELEPHONE (OUTPATIENT)
Dept: ORTHOPEDIC SURGERY | Facility: HOSPITAL | Age: 69
End: 2024-11-25
Payer: MEDICARE

## 2024-11-25 RX ORDER — HYDROCODONE BITARTRATE AND ACETAMINOPHEN 7.5; 325 MG/1; MG/1
1 TABLET ORAL EVERY 4 HOURS PRN
Qty: 30 TABLET | Refills: 0 | Status: SHIPPED | OUTPATIENT
Start: 2024-11-25 | End: 2024-12-02 | Stop reason: SDUPTHER

## 2024-11-25 NOTE — TELEPHONE ENCOUNTER
Post op day 3  Discharge Instructions:  Ask patient about his or her discharge instructions  ?  Patient confirmed understanding   []  Further instruction needed   What, if any, recommendations, teaching, or interventions did you provide? Click or tap here to enter text.  Health status:  Pain controlled Yes   Pain is getting a little better. She is taking the medication regularly.   Recommended interventions:  Yes  incision/dressing status   ?  Clean without redness, drainage, odor  []  Redness    []  Drainage - color Click or tap here to enter text.  []  Odor  DARRELL - Green light blinking Yes  Difficulties urination No  Last BM 11/24/2024 (if no BM by day 3-recommend OTC suppository or fleets enema)  Able to have one yesterday taking the medication.   Medications:  ?Medications reviewed with patient/family/caregiver  Patient taking medications as prescribed?   Yes  If not taking medications as prescribed, note specific medicine(s) and reason for each:  Click or tap here to enter text.  Hospital Follow Up Plan:  Follow up Appointment with Orthopedic surgeon:  ?Has f/u appointment                []Scheduled f/u appointment  Home Care ordered at discharge?    Yes        Home Care started, or contact made?    Yes   If no, action taken:   DME obtained/used in home?         Yes   Using IS  Choose an item.   Other information: Ms. Morris said she is doing ok. She has some increased stiffness, but she is getting up and walking and doing the exercises. PT has been out to see her and they are coming again tomorrow. Pain is controlled with the pain medication. She is taking it regularly. Dressing remains cdi, green light blinking. The box does vibrate at times and she said it's beeping. Explained I wasn't sure about the beeping, but we did go over trouble shooting tips. She voiced understanding. She is icing and elevating. Minimal swelling noted. She was able to have a BM yesterday. Ms. Morris doesn't have any other questions  for me at this time. she has my contact information should she need anything.

## 2024-11-26 ENCOUNTER — TELEPHONE (OUTPATIENT)
Dept: ORTHOPEDIC SURGERY | Facility: CLINIC | Age: 69
End: 2024-11-26

## 2024-11-26 ENCOUNTER — HOME CARE VISIT (OUTPATIENT)
Dept: HOME HEALTH SERVICES | Facility: HOME HEALTHCARE | Age: 69
End: 2024-11-26
Payer: MEDICARE

## 2024-11-26 VITALS
RESPIRATION RATE: 18 BRPM | TEMPERATURE: 97.9 F | SYSTOLIC BLOOD PRESSURE: 134 MMHG | OXYGEN SATURATION: 95 % | HEART RATE: 77 BPM | DIASTOLIC BLOOD PRESSURE: 90 MMHG

## 2024-11-26 DIAGNOSIS — Z96.652 STATUS POST TOTAL LEFT KNEE REPLACEMENT: Primary | ICD-10-CM

## 2024-11-26 PROCEDURE — G0151 HHCP-SERV OF PT,EA 15 MIN: HCPCS

## 2024-11-26 NOTE — HOME HEALTH
"Subjective: \"Things have calmed down a little bit\" per patient    no new med changes  no recent falls    Skill/education provided: see interventions for details    Patient/caregiver response: see interventions for details    Assessment: patient with good follow through with HEP and use of ROM tech bike. Good ROM progression. Ongoing skilled physical therapy is needed due to increased fall risk    Plan for next visit: progress to standing ther ex, progress ROM beyond 95-98 degrees, gait training with cane as able"

## 2024-11-26 NOTE — TELEPHONE ENCOUNTER
Caller: Moriah Morris    Relationship: Self    Best call back number: [phone]*    What is the medical concern/diagnosis: TKA LT KNEE    What specialty or service is being requested: OUTPATIENT THERAPY    What is the provider, practice or medical service name: [facility]    What is the office location: [place]

## 2024-11-29 ENCOUNTER — HOME CARE VISIT (OUTPATIENT)
Dept: HOME HEALTH SERVICES | Facility: HOME HEALTHCARE | Age: 69
End: 2024-11-29
Payer: MEDICARE

## 2024-11-29 VITALS
DIASTOLIC BLOOD PRESSURE: 66 MMHG | HEART RATE: 76 BPM | OXYGEN SATURATION: 99 % | SYSTOLIC BLOOD PRESSURE: 106 MMHG | TEMPERATURE: 97.1 F

## 2024-11-29 PROCEDURE — G0151 HHCP-SERV OF PT,EA 15 MIN: HCPCS

## 2024-11-29 NOTE — HOME HEALTH
"SUBJECTIVE: \" do you think I'm exercising too much? I had a miserable night again. I'm concerned that I'm going to take too much medication\"    No new med changes.     No recent Falls.     ASSESSMENT: ROM continues to slowly progress; Pain appears as expected with current recovery stage. has slowly been reducing use of narcotics.  Beginning to transition to use of SPC     SKILL/EDUCATION PROVIDED: see interventions for details   PATIENT/CAREGIVER RESPONSE: see interventions for details   ____________   PLAN FOR NEXT VISIT:   MEDICAL NECESSITY FOR ONGOING SKILLED THERAPY: Skilled physical therapy is medically necessary for treatment of: gait, transfer, ROM, strength and balance deficits following recent hospitalization for L TKA .Requires instruction in appropriate progression of exercises; education in fall prevention/pain/edema management; gait training to reduce reliance on assistive device; balance retraining to prevent falls. Without skilled physical therapy, patient at risk for: falls, rehospitalization, increased reliance on caregiver, chronic pain,     SPECIFIC INTERVENTIONS AND GOALS TO ADDRESS ON NEXT VISIT:   - progress  standing exercises   - gait training to restore normal mechanics with aid of SPC: stair assessment    - increase AROM greater than  degrees L knee   - car transfer training    FREQUENCY AND DURATION:   - 1 week 1; 3 week 1; 2 week 1     ANY OTHER FOLLOW UP NEEDED: none     DATE OF NEXT APPOINTMENT WITH DOCTOR: follow up on 12/5 with Dr Selam Paulino 12/6 outpatient PT    REASSESSMENT DUE DATE: 30 day: 12/22/24"

## 2024-12-02 ENCOUNTER — HOME CARE VISIT (OUTPATIENT)
Dept: HOME HEALTH SERVICES | Facility: HOME HEALTHCARE | Age: 69
End: 2024-12-02
Payer: MEDICARE

## 2024-12-02 VITALS
SYSTOLIC BLOOD PRESSURE: 145 MMHG | HEART RATE: 67 BPM | TEMPERATURE: 97.4 F | OXYGEN SATURATION: 96 % | DIASTOLIC BLOOD PRESSURE: 88 MMHG | RESPIRATION RATE: 18 BRPM

## 2024-12-02 DIAGNOSIS — Z98.890 S/P KNEE SURGERY: ICD-10-CM

## 2024-12-02 PROCEDURE — G0151 HHCP-SERV OF PT,EA 15 MIN: HCPCS

## 2024-12-02 RX ORDER — HYDROCODONE BITARTRATE AND ACETAMINOPHEN 7.5; 325 MG/1; MG/1
1 TABLET ORAL EVERY 4 HOURS PRN
Qty: 30 TABLET | Refills: 0 | Status: SHIPPED | OUTPATIENT
Start: 2024-12-02

## 2024-12-02 NOTE — HOME HEALTH
"Subjective: \"When will the soreness go away?\"per patient    no new med changes  no recent falls    Skill/education provided: see interventions for details    Patient/caregiver response: see interventions for details    Assessment: patient able to progress standing ther ex and do stairs today with no issues. Ongoing skilled physical therapy is needed due to increased fall risk    Plan for next visit: progress ROM beyond 107, stairs as able, car transfer"

## 2024-12-04 ENCOUNTER — HOME CARE VISIT (OUTPATIENT)
Dept: HOME HEALTH SERVICES | Facility: HOME HEALTHCARE | Age: 69
End: 2024-12-04
Payer: MEDICARE

## 2024-12-04 VITALS
DIASTOLIC BLOOD PRESSURE: 94 MMHG | HEART RATE: 67 BPM | RESPIRATION RATE: 18 BRPM | OXYGEN SATURATION: 96 % | SYSTOLIC BLOOD PRESSURE: 139 MMHG | TEMPERATURE: 97.6 F

## 2024-12-04 PROCEDURE — G0151 HHCP-SERV OF PT,EA 15 MIN: HCPCS

## 2024-12-04 NOTE — Clinical Note
Discharge Summary:    Patient was seen for PT discharge today due to PT goals met see interventions/goal status for details. Patient was seen for a total of 6 visits for L TKR       for evaluation, gait training, transfer training, home safety, fall prevention, and pain/edema management. Currently patient is able to to amb IND/SUP with FWW or cane, IND with transfers, IND with HEP with written handouts, IND with pain/edema management and fall prevention. Patient agrees with PT discharge.    Home environment: lives with sister who provides assist as needed    Follow up: with MD and outpatient PT

## 2024-12-05 ENCOUNTER — OFFICE VISIT (OUTPATIENT)
Dept: ORTHOPEDIC SURGERY | Facility: CLINIC | Age: 69
End: 2024-12-05
Payer: MEDICARE

## 2024-12-05 VITALS — TEMPERATURE: 98 F | BODY MASS INDEX: 32 KG/M2 | HEIGHT: 60 IN | WEIGHT: 163 LBS

## 2024-12-05 DIAGNOSIS — M17.11 PRIMARY OSTEOARTHRITIS OF RIGHT KNEE: Primary | ICD-10-CM

## 2024-12-05 PROBLEM — M17.9 OA (OSTEOARTHRITIS) OF KNEE: Status: ACTIVE | Noted: 2024-12-05

## 2024-12-05 RX ORDER — CHLORHEXIDINE GLUCONATE 500 MG/1
CLOTH TOPICAL 2 TIMES DAILY
OUTPATIENT
Start: 2024-12-05

## 2024-12-05 RX ORDER — ACETAMINOPHEN 500 MG
1000 TABLET ORAL EVERY 6 HOURS PRN
COMMUNITY

## 2024-12-05 RX ORDER — PREGABALIN 150 MG/1
150 CAPSULE ORAL ONCE
OUTPATIENT
Start: 2024-12-05 | End: 2024-12-05

## 2024-12-05 NOTE — PROGRESS NOTES
Moriah Morris : 1955 MRN: [id number] DATE: 2024    DIAGNOSIS: 2 week follow up left total knee , right knee severe OA     SUBJECTIVE:Patient returns today for 2 week follow up of left total knee replacement. Patient reports doing well with no unusual complaints. Appears to be progressing appropriately. Right knee hurts as much or more so than the left.    OBJECTIVE:   Exam:. The incision is healing appropriately. No sign of infection. Range of motion is progressing as expected. The calf is soft and nontender with a negative Homans sign.  Knee:  right    ALIGNMENT:     Varus  ,   Patella  tracks  midline    GAIT:    Antalgic    SKIN:    No abnormality    RANGE OF MOTION:   5  -  108   DEG    STRENGTH:   4  / 5    LIGAMENTS:    No varus / valgus instability.   Negative  Lachman.    MENISCUS:     Negative   Nyla       DISTAL PULSES:    Paplable    DISTAL SENSATION :   Intact    LYMPHATICS:     No   lymphadenopathy    OTHER:          - Positive   effusion      - Crepitance with ROM      ASSESSMENT: 2 week status post left knee replacement. Doing well    PLAN: 1) Staples removed and steri strips applied   2) Order given for PT   3) Discontinue BRUCE hose   4) Continue ice PRN   5) aspirin 81 mg orally every day for 1 month   6) Follow up in 6 weeks with repeat Xrays of left knee (3views)  Continuation of conservative management vs. TKA discussed.  The patient wishes to proceed with total knee replacement.  At this point the patient has failed the full compliment of conservative treatment and stating complete understanding of the risks/benefits/ anternatives wishes to proceed with surgical treatment.    Risk and benefits of surgery were reviewed.  Including, but not limited to, blood clots or pulmonary embolism, anesthesia risk, infection, fracture, skin/leg numbness, persistent pain/crepitance/popping/catching, failure of the implant, need for future surgeries, hematoma, possible nerve or blood vessel  injury, need for transfusion, and potential risk of stroke,heart attack or death, among others.  The patient understands and wishes to proceed.     It was explained that if tissue has been repaired or reconstructed, there is also an increased chance of failure which may require further management.  Following the completion of the discussion, the patient expressed understanding of this planned course of care, all their questions were answered and consent will be obtained preoperatively.    Operative Plan: right Smith and Nephmonica Oxinium Total Knee Replacement performing the procedure on an outpatient basis with home health rehab      Jason Mianya MD  12/5/2024

## 2024-12-06 ENCOUNTER — TREATMENT (OUTPATIENT)
Dept: PHYSICAL THERAPY | Facility: CLINIC | Age: 69
End: 2024-12-06
Payer: MEDICARE

## 2024-12-06 DIAGNOSIS — M25.562 POSTOPERATIVE PAIN OF LEFT KNEE: Primary | ICD-10-CM

## 2024-12-06 DIAGNOSIS — M25.662 DECREASED RANGE OF MOTION (ROM) OF LEFT KNEE: ICD-10-CM

## 2024-12-06 DIAGNOSIS — R29.898 WEAKNESS OF LEFT LEG: ICD-10-CM

## 2024-12-06 DIAGNOSIS — Z96.652 S/P TKR (TOTAL KNEE REPLACEMENT), LEFT: ICD-10-CM

## 2024-12-06 DIAGNOSIS — G89.18 POSTOPERATIVE PAIN OF LEFT KNEE: Primary | ICD-10-CM

## 2024-12-06 NOTE — PROGRESS NOTES
Physical Therapy Initial Evaluation and Plan of Care    [address] [id number]      Patient: Moriah Morris   : 1955  Diagnosis/ICD-10 Code:  Postoperative pain of left knee [G89.18, M25.562]  Referring practitioner: Jason Minaya MD  Date of Initial Visit: 2024  Today's Date:  2024  Patient seen for 1 sessions           Subjective Questionnaire: LEFS:       Subjective Evaluation    History of Present Illness  Date of surgery: 2024  Mechanism of injury: Pt underwent L TKR on 24 by Dr. Jason Minaya. Pt has done 2 weeks of home health PT with last session on 24. Pt went home same day after surgery. Pt has [employer] bike she is doing 3 times per day; also has stationary bike at home. Pt planning on having R knee done in 2025. Having trouble sleeping in bed.     Hobbies: cycling, tennis, hiking      Patient Occupation: retired Quality of life: excellent    Pain  Current pain rating: 3  At worst pain ratin  Location: L knee  Quality: dull ache (sore)  Relieving factors: ice, medications and relaxation (last pain medication yesterday; tylenol today)  Aggravating factors: movement, stairs, standing, ambulation, prolonged positioning, squatting, outstretched reach and repetitive movement  Progression: improved    Social Support  Lives in: one-story house and multiple-level home (3 steps to enter, flight of steps down to basement 13 steps)  Lives with: her sister.    Hand dominance: right    Diagnostic Tests  X-ray: normal (Total left knee arthroplasty. No lucency around the hardware. Normal  alignment. No fracture. No dislocation. Joint drain. Soft tissue  emphysema.)    Treatments  Previous treatment: physical therapy  Discharged from (in last 30 days): home health care  Discharged from (in last 30 days) comments: .   Patient Goals  Patient goals for therapy: decreased pain, improved balance, increased motion, increased strength, independence with  ADLs/IADLs, return to sport/leisure activities and return to work           Past Medical History:   Diagnosis Date    Arthritis     Cataract     Cataract surgery both eyes 2019    Fracture of wrist Unknown    Right hand    Fracture, foot 2015    Knee swelling Unknown    Both knees    Neuroma of foot Unknown    Left foot    Tear of meniscus of knee Unknown    Left knee    Tennis elbow Unknown    Right arm      Past Surgical History:   Procedure Laterality Date    ANKLE ARTHROSCOPY Left     CATARACT EXTRACTION, BILATERAL      CHOLECYSTECTOMY      COLONOSCOPY      FOOT SURGERY Left     FOR FOOT FRACTURE    KNEE MENISCECTOMY Left     KNEE SURGERY  Unknown    Meniscus repair-left knee    TOTAL KNEE ARTHROPLASTY Left 11/22/2024    Procedure: TOTAL KNEE ARTHROPLASTY;  Surgeon: Jason Minaya MD;  Location: [facility] OR [facility] [place];  Service: Orthopedics;  Laterality: Left;          Objective          Observations   Left Knee   Positive for edema, effusion and incision.     Additional Knee Observation Details  Steri-strips intact; no s/s of infection    Palpation   Left   Hypertonic in the distal biceps femoris, distal semimembranosus, distal semitendinosus, rectus femoris, vastus lateralis and vastus medialis.   Tenderness of the distal biceps femoris, distal semimembranosus, distal semitendinosus, rectus femoris, vastus lateralis and vastus medialis.     Tenderness   Left Knee   Tenderness in the inferior fat pad, ITB, lateral joint line, medial joint line, patellar tendon, popliteal fossa, quadriceps tendon and superior patella.     Neurological Testing     Sensation     Knee   Left Knee   Intact: Light touch    Right Knee   Intact: light touch     Additional Neurological Details  Pt denies numbness or tingling BLE    Active Range of Motion   Left Knee   Flexion: 107 degrees with pain  Extension: 0 degrees with pain    Right Knee   Flexion: 127 degrees   Extension: 0 degrees     Additional Active Range of Motion Details  Mild ext  "lag with SLR LLE    Passive Range of Motion   Left Knee   Flexion: 112 degrees with pain    Patellar Mobility   Left Knee Patellar tendons within functional limits include the medial and lateral. Hypomobile in the left superior and left inferior patellar tendon(s).     Strength/Myotome Testing     Left Knee   Flexion: 3+  Extension: 4  Quadriceps contraction: fair    Right Knee   Flexion: 5  Extension: 5    Tests     Additional Tests Details  Negative Jae's sign LLE    Swelling     Left Knee Girth Measurement (cm)   Joint line: 41 cm  10 cm above joint line: 48 cm  10 cm below joint line: 35.5 cm    Right Knee Girth Measurement (cm)   Joint line: 38 cm  10 cm above joint line: 44 cm  10 cm below joint line: 35 cm    Ambulation   Weight-Bearing Status   Weight-Bearing Status (Left): weight-bearing as tolerated   Assistive device used: none    Observational Gait   Gait: antalgic   Decreased left stance time and left step length.     Additional Observational Gait Details  L knee stiffness    Functional Assessment   Squat   Pain and sitting toward right side.     Forward Step Up 6\"   Left Leg  Pain.           Assessment & Plan       Assessment  Impairments: abnormal gait, abnormal muscle firing, abnormal or restricted ROM, activity intolerance, impaired balance, impaired physical strength, lacks appropriate home exercise program, pain with function and weight-bearing intolerance   Functional limitations: carrying objects, lifting, sleeping, walking, uncomfortable because of pain, moving in bed, sitting, standing, stooping and unable to perform repetitive tasks   Assessment details: Moriah Morris is a 69 y.o. year-old female referred to physical therapy with L knee pain following L TKA on 11/22/24. She presents with a stable clinical presentation.  She has comorbidities (see subjective) and personal factors of an active lifestyle that may affect her progress in the plan of care.  Signs and symptoms are consistent with " physical therapy diagnosis of impaired L knee ROM, strength and flexibility with mild antalgic gait without AD. Patient is appropriate for skilled physical therapy in order to reduce pain and increase ease with daily mobility.  During evaluation, pt educated on anatomy, goal of interventions, positioning, and body mechanics to promote healthy lifestyle and improve quality of life.   Prognosis: good    Goals  Plan Goals: STG: ~6 weeks  1. Pt will be demonstrate 0-120 degrees of knee extension and flexion to be able to sit in a chair properly  2. Pt will improve LEFS score to 40/80 to improve subjective function of LE with ADLs  3. Pt will be able to walk for 20 minutes to be able to navigate grocery store   4. Pt will demonstrate a SLR without extensor lag to improve terminal knee extension    LTG: ~12 weeks  1. Pt will decreased L knee joint swelling from 41 cm to 38 cm   2. Pt will be able to complete basement steps with single HR and reciprocal pattern ascending/descending  3. Pt will be able to walk for 30 minutes  or 1 mile with L knee pain <2/10  4. Pt will improve knee extensor and knee flexor strength to 5/5 to be able to complete transfers without UE assistance  5. Pt will be I with HEP      Plan  Therapy options: will be seen for skilled therapy services  Planned modality interventions: cryotherapy, electrical stimulation/Russian stimulation, thermotherapy (hydrocollator packs), ultrasound, dry needling and TENS  Planned therapy interventions: ADL retraining, balance/weight-bearing training, body mechanics training, flexibility, functional ROM exercises, gait training, home exercise program, joint mobilization, manual therapy, neuromuscular re-education, postural training, soft tissue mobilization, spinal/joint mobilization, strengthening, stretching and therapeutic activities  Treatment plan discussed with: patient  Plan details: 3 times per week 12 weeks        Visit Diagnoses:    ICD-10-CM ICD-9-CM   1.  Postoperative pain of left knee  G89.18 338.18    M25.562 719.46   2. S/P TKR (total knee replacement), left  Z96.652 V43.65   3. Decreased range of motion (ROM) of left knee  M25.662 719.56   4. Weakness of left leg  R29.898 729.89       Timed:  Manual Therapy:    -     mins  37740;  Therapeutic Exercise:    15     mins  74738;     Neuromuscular Pearl:    -    mins  58722;    Therapeutic Activity:     10     mins  65373;     Gait Training:      -     mins  19723;     Ultrasound:     -     mins  90644;    Electrical Stimulation:    -     mins  16488 ( );    Low Eval                       20      Mins  89720  Mod Eval                        -     Mins  92085  High Eval                       -     Mins  24503    Untimed:  Electrical Stimulation:    -     mins  62618 ( );  Mechanical Traction:    -     mins  74052;     Timed Treatment:   25   mins   Total Treatment:     55   mins    PT SIGNATURE: DANII Aaron license: [id number]  DATE TREATMENT INITIATED: 12/6/2024    Initial Certification  Certification Period: 3/6/2025  I certify that the therapy services are furnished while this patient is under my care.  The services outlined above are required by this patient, and will be reviewed every 90 days.     PHYSICIAN: Jason Minaya MD      DATE:     Please sign and return via fax to [phone]. Thank you, [facility].

## 2024-12-09 ENCOUNTER — TREATMENT (OUTPATIENT)
Dept: PHYSICAL THERAPY | Facility: CLINIC | Age: 69
End: 2024-12-09
Payer: MEDICARE

## 2024-12-09 DIAGNOSIS — G89.18 POSTOPERATIVE PAIN OF LEFT KNEE: Primary | ICD-10-CM

## 2024-12-09 DIAGNOSIS — M25.562 POSTOPERATIVE PAIN OF LEFT KNEE: Primary | ICD-10-CM

## 2024-12-09 DIAGNOSIS — M25.662 DECREASED RANGE OF MOTION (ROM) OF LEFT KNEE: ICD-10-CM

## 2024-12-09 DIAGNOSIS — R29.898 WEAKNESS OF LEFT LEG: ICD-10-CM

## 2024-12-09 DIAGNOSIS — Z96.652 S/P TKR (TOTAL KNEE REPLACEMENT), LEFT: ICD-10-CM

## 2024-12-09 PROCEDURE — 97530 THERAPEUTIC ACTIVITIES: CPT | Performed by: PHYSICAL THERAPIST

## 2024-12-09 PROCEDURE — 97110 THERAPEUTIC EXERCISES: CPT | Performed by: PHYSICAL THERAPIST

## 2024-12-09 NOTE — PROGRESS NOTES
Physical Therapy Daily Treatment Note    Patient: Moriah Morris   : 1955  Referring practitioner: No ref. provider found  Date of Initial Visit: Type: THERAPY  Noted: 2024  Today's Date: 2024  Patient seen for 2 sessions       Visit Diagnoses:    ICD-10-CM ICD-9-CM   1. Postoperative pain of left knee  G89.18 338.18    M25.562 719.46   2. Decreased range of motion (ROM) of left knee  M25.662 719.56   3. S/P TKR (total knee replacement), left  Z96.652 V43.65   4. Weakness of left leg  R29.898 729.89       Moriah Morris reports: she is feeling pretty good today.  Pt denies having any issues or increased pain after her first visit.  Pt states they are coming to get her bike soon.      Subjective       Objective          Active Range of Motion   Left Knee   Flexion: 112 degrees   Extension: 6 degrees       See Exercise, Manual, and Modality Logs for complete treatment.       Assessment & Plan       Assessment  Assessment details: Pt is tolerating treatment well with improving ROM in flexion.  Added Nustep, TKE, hamstring stretching, and QS today to improve knee extension AROM.  Pt reached full knee extension after stretching.  Educated pt about positioning her pillow at home so it is not resting her knee in slight flexion.  Pt completed all stretches and strengthening exercises without difficulty.  Will continue to see pt 2-3x/week for stretching, strengthening and modalities PRN for pain.          Progress per Plan of Care      Timed:         Manual Therapy:         mins  12224;     Therapeutic Exercise:  18       mins  98788;     Neuromuscular Pearl:        mins  37568;    Therapeutic Activity:    10      mins  63590;     Gait Training:           mins  90163;     Ultrasound:          mins  84722;    Ionto                                   mins   99697  Self Care                            mins   08785  Canalith Repos         mins 94774      Un-Timed:  Electrical Stimulation:         mins  41319 (  );  Dry Needling          mins self-pay  Traction          mins 20793      Timed Treatment:  28    mins   Total Treatment:     43   mins    Emely Lieberman, PT  [place] License: [id number]

## 2024-12-11 ENCOUNTER — TREATMENT (OUTPATIENT)
Dept: PHYSICAL THERAPY | Facility: CLINIC | Age: 69
End: 2024-12-11
Payer: MEDICARE

## 2024-12-11 DIAGNOSIS — M25.562 POSTOPERATIVE PAIN OF LEFT KNEE: Primary | ICD-10-CM

## 2024-12-11 DIAGNOSIS — G89.18 POSTOPERATIVE PAIN OF LEFT KNEE: Primary | ICD-10-CM

## 2024-12-11 DIAGNOSIS — M25.662 DECREASED RANGE OF MOTION (ROM) OF LEFT KNEE: ICD-10-CM

## 2024-12-11 DIAGNOSIS — R29.898 WEAKNESS OF LEFT LEG: ICD-10-CM

## 2024-12-11 DIAGNOSIS — Z96.652 S/P TKR (TOTAL KNEE REPLACEMENT), LEFT: ICD-10-CM

## 2024-12-11 NOTE — PROGRESS NOTES
Physical Therapy Daily Treatment Note    Patient: Moriah Morris   : 1955  Referring practitioner: Jason Minaya MD  Date of Initial Visit: Type: THERAPY  Noted: 2024  Today's Date: 2024  Patient seen for 3 sessions       Visit Diagnoses:    ICD-10-CM ICD-9-CM   1. Postoperative pain of left knee  G89.18 338.18    M25.562 719.46   2. Decreased range of motion (ROM) of left knee  M25.662 719.56   3. S/P TKR (total knee replacement), left  Z96.652 V43.65   4. Weakness of left leg  R29.898 729.89       Moriah Morris reports: she did not sleep well Monday night, so now she is taking a pain pill at night and prior to PT.  Pt states her knee is still hurting.       Subjective       Objective          Active Range of Motion   Left Knee   Flexion: 116 degrees   Extension: 0 degrees       See Exercise, Manual, and Modality Logs for complete treatment.       Assessment & Plan       Assessment  Assessment details: Pt is tolerating treatment well with improving ROM and strength.  Pt reached full knee extension after stretching.  Pt completed all stretches and strengthening exercises without difficulty.  Increased reps to 15, increased step height to 6 in, added step downs on a 4 in step, and added sidelying hip abd.  Ended treatment with ice to the left knee to decrease pain and swelling post exercise.  Will continue to see pt 2-3x/week for stretching, strengthening and modalities PRN for pain.          Progress per Plan of Care      Timed:         Manual Therapy:         mins  98356;     Therapeutic Exercise:   20      mins  31269;     Neuromuscular Pearl:        mins  98547;    Therapeutic Activity:    15      mins  75452;     Gait Training:           mins  32659;     Ultrasound:          mins  37150;    Ionto                                   mins   41506  Self Care                            mins   90309  Canalith Repos         mins 83349      Un-Timed:  Electrical Stimulation:         mins  84149 (  );  Dry Needling          mins self-pay  Traction          mins 73385      Timed Treatment:  35    mins   Total Treatment:    50    mins    Emely Lieberman, PT  [place] License: [id number]

## 2024-12-12 DIAGNOSIS — Z98.890 S/P KNEE SURGERY: ICD-10-CM

## 2024-12-12 RX ORDER — HYDROCODONE BITARTRATE AND ACETAMINOPHEN 7.5; 325 MG/1; MG/1
1 TABLET ORAL EVERY 4 HOURS PRN
Qty: 30 TABLET | Refills: 0 | Status: SHIPPED | OUTPATIENT
Start: 2024-12-12

## 2024-12-13 ENCOUNTER — TREATMENT (OUTPATIENT)
Dept: PHYSICAL THERAPY | Facility: CLINIC | Age: 69
End: 2024-12-13
Payer: MEDICARE

## 2024-12-13 DIAGNOSIS — G89.18 POSTOPERATIVE PAIN OF LEFT KNEE: Primary | ICD-10-CM

## 2024-12-13 DIAGNOSIS — M25.562 POSTOPERATIVE PAIN OF LEFT KNEE: Primary | ICD-10-CM

## 2024-12-13 DIAGNOSIS — R29.898 WEAKNESS OF LEFT LEG: ICD-10-CM

## 2024-12-13 DIAGNOSIS — Z96.652 S/P TKR (TOTAL KNEE REPLACEMENT), LEFT: ICD-10-CM

## 2024-12-13 DIAGNOSIS — M25.662 DECREASED RANGE OF MOTION (ROM) OF LEFT KNEE: ICD-10-CM

## 2024-12-13 PROCEDURE — 97110 THERAPEUTIC EXERCISES: CPT | Performed by: PHYSICAL THERAPIST

## 2024-12-13 PROCEDURE — 97530 THERAPEUTIC ACTIVITIES: CPT | Performed by: PHYSICAL THERAPIST

## 2024-12-13 NOTE — PROGRESS NOTES
Physical Therapy Daily Treatment Note    Patient: Moriah Morris   : 1955  Referring practitioner: Jason Minaya MD  Date of Initial Visit: Type: THERAPY  Noted: 9/10/2024  Today's Date: 2024  Patient seen for 2 sessions       Visit Diagnoses:    ICD-10-CM ICD-9-CM   1. Postoperative pain of left knee  G89.18 338.18    M25.562 719.46   2. Decreased range of motion (ROM) of left knee  M25.662 719.56   3. S/P TKR (total knee replacement), left  Z96.652 V43.65   4. Weakness of left leg  R29.898 729.89       Moriah Morris reports: she is doing well and she wants to go over how to get into her Jeep with her new running boards.  Pt wants to start driving.  She is off her pain pills now.      Subjective       Objective          Active Range of Motion   Left Knee   Flexion: 120 degrees   Extension: 0 degrees       See Exercise, Manual, and Modality Logs for complete treatment.     Went out to parking lot to instructed pt in how to step up into her Jeep using her running boards on the 's side.      Assessment & Plan       Assessment  Assessment details: Pt is tolerating treatment well with improving ROM and strength.  Pt completed all stretches and strengthening exercises without difficulty.   Added B leg press, 2# weight with SAQ, and pt completed full revolutions on the [place].   Ended treatment with ice to the left knee to decrease pain and swelling post exercise.  Will continue to see pt 2-3x/week for stretching, strengthening and modalities PRN for pain.           Progress per Plan of Care      Timed:         Manual Therapy:         mins  79092;     Therapeutic Exercise:   20      mins  20170;     Neuromuscular Pearl:        mins  85528;    Therapeutic Activity:    15      mins  55228;     Gait Training:           mins  52940;     Ultrasound:          mins  78155;    Ionto                                   mins   98058  Self Care                            mins   21207  [facility] Repos         mins  14220      Un-Timed:  Electrical Stimulation:         mins  03554 ( );  Dry Needling          mins self-pay  Traction          mins 30054      Timed Treatment:  35    mins   Total Treatment:    50    mins    Emely Lieberman, PT  [place] License: [id number]

## 2024-12-16 ENCOUNTER — TREATMENT (OUTPATIENT)
Dept: PHYSICAL THERAPY | Facility: CLINIC | Age: 69
End: 2024-12-16
Payer: MEDICARE

## 2024-12-16 DIAGNOSIS — M25.562 POSTOPERATIVE PAIN OF LEFT KNEE: Primary | ICD-10-CM

## 2024-12-16 DIAGNOSIS — Z96.652 S/P TKR (TOTAL KNEE REPLACEMENT), LEFT: ICD-10-CM

## 2024-12-16 DIAGNOSIS — G89.18 POSTOPERATIVE PAIN OF LEFT KNEE: Primary | ICD-10-CM

## 2024-12-16 DIAGNOSIS — M25.662 DECREASED RANGE OF MOTION (ROM) OF LEFT KNEE: ICD-10-CM

## 2024-12-16 PROCEDURE — 97110 THERAPEUTIC EXERCISES: CPT | Performed by: PHYSICAL THERAPIST

## 2024-12-16 PROCEDURE — 97530 THERAPEUTIC ACTIVITIES: CPT | Performed by: PHYSICAL THERAPIST

## 2024-12-16 NOTE — PROGRESS NOTES
"Physical Therapy Daily Treatment Note    [facility]  [address] [place] [address]  [phone] (phone)  [phone] (fax)    Patient: Moriah Morris   : 1955  Diagnosis/ICD-10 Code:  Postoperative pain of left knee [G89.18, M25.562]  Referring practitioner: Jason Minaya MD  Date of Initial Visit: Type: THERAPY  Noted: 2024  Today's Date:  2024  Patient seen for 4 sessions           Subjective   Patient reports her knee is still very stiff and she is not sleeping well at night. She is trying to use her bike at home to help reduce the tightness.    Objective     See Exercise, Manual, and Modality Logs for complete treatment.     L KNEE ROM: 0-120 degrees    Assessment/Plan  Encouraged shorter but more frequent bouts of biking at home to help reduce knee stiffness. Patient demonstrates better eccentric muscle control with step downs; May consider progression to 6\" height next visit. Patient demonstrates functional knee ROM at this time but still has mild tightness with knee flexion at end range.            Timed:    Manual Therapy:         mins  49956;  Therapeutic Exercise:    20     mins  76348;     Neuromuscular Pearl:        mins  22136;    Therapeutic Activity:     10     mins  98523;     Gait Training:           mins  80995;     Ultrasound:          mins  25825;    Electrical Stimulation:         mins  80653 ( );  Iontophoresis         mins 54160;  Aquatic Therapy         mins 60536;    Untimed:  Electrical Stimulation:         mins  17597 ( );  Traction:         mins  61520;   Dry Needling   (1-2 muscles)       mins  (Self-pay)  Dry Needling (3-4 muscles)        mins  (Self-pay)  Dry Needling Trial          mins DRYNDLTRIAL  (No Charge)    Timed Treatment:   30   mins   Total Treatment:     45   mins    Iza Crawford PT  Physical Therapist    [place] License:[id number]  " Good job with your weekly workout at [employer]. - Try to go 2x/week.  -good job getting in your walking on other days of the week. Cut in 1/2 added Ranch and pbutter with snacks. Good job selecting grilled or baked items when eating out. - Limit or avoid high fat condiments, such as, sour cream, dressings.  - Add cooked vegetables with meals when available - Good job selecting broccoli with meals.     Check BS 1-2 x/week after a meal - check 2 hours after the start of a meal.  BS goal:  100 - 150/160     Cut back on nuts from 1/3 cup to 1/4 cup to help with weight loss efforts. Drink adequate water - 3-4 bottles/day. Your caffeine free sugar free drinks can count toward your fluid intake too. But always good to drink water most of the time.

## 2024-12-18 ENCOUNTER — TREATMENT (OUTPATIENT)
Dept: PHYSICAL THERAPY | Facility: CLINIC | Age: 69
End: 2024-12-18
Payer: MEDICARE

## 2024-12-18 DIAGNOSIS — G89.18 POSTOPERATIVE PAIN OF LEFT KNEE: Primary | ICD-10-CM

## 2024-12-18 DIAGNOSIS — M25.662 DECREASED RANGE OF MOTION (ROM) OF LEFT KNEE: ICD-10-CM

## 2024-12-18 DIAGNOSIS — R29.898 WEAKNESS OF LEFT LEG: ICD-10-CM

## 2024-12-18 DIAGNOSIS — Z96.652 S/P TKR (TOTAL KNEE REPLACEMENT), LEFT: ICD-10-CM

## 2024-12-18 DIAGNOSIS — M25.562 POSTOPERATIVE PAIN OF LEFT KNEE: Primary | ICD-10-CM

## 2024-12-18 NOTE — PROGRESS NOTES
Physical Therapy Daily Treatment Note    Patient: Moriah Morris   : 1955  Referring practitioner: Jason Minaya MD  Date of Initial Visit: Type: THERAPY  Noted: 2024  Today's Date: 2024  Patient seen for 5 sessions       Visit Diagnoses:    ICD-10-CM ICD-9-CM   1. Postoperative pain of left knee  G89.18 338.18    M25.562 719.46   2. Decreased range of motion (ROM) of left knee  M25.662 719.56   3. S/P TKR (total knee replacement), left  Z96.652 V43.65   4. Weakness of left leg  R29.898 729.89       Moriah Morris reports: still having trouble sleeping through the night.  It is still sore and stiff.      Subjective       Objective          Active Range of Motion     Right Knee   Extension: 0 degrees     Additional Active Range of Motion Details  Supine left knee AAROM flex 120 degrees       See Exercise, Manual, and Modality Logs for complete treatment.       Assessment & Plan       Assessment  Assessment details: Pt is progressing well with therapy.  Pt with increasing ROM, strength and functional mobility.  Pt with some tightness with knee flex AROM today, but after some passive stretching pt reached 120 degrees with assist.  Added single leg press, weight with LAQ, and step height with step downs.  Pt completed all stretches and strengthening exercises well.  Ended treatment with cold pack to decrease post exercise soreness.  Will continue to see pt 3x/week for stretching, strengthening and modalities PRN for pain.          Progress per Plan of Care      Timed:         Manual Therapy:   5      mins  15134;     Therapeutic Exercise:    24     mins  01247;     Neuromuscular Pearl:        mins  74408;    Therapeutic Activity:    15      mins  25400;     Gait Training:           mins  12671;     Ultrasound:          mins  17682;    Ionto                                   mins   37691  Self Care                            mins   51036  [facility]         mins 64606      Un-Timed:  Electrical  Stimulation:         mins  56828 ( );  Dry Needling          mins self-pay  Traction          mins 16182      Timed Treatment:  44    mins   Total Treatment:     60   mins    Emely Lieberman, PT  [place] License: [id number]

## 2024-12-19 ENCOUNTER — TELEPHONE (OUTPATIENT)
Dept: ORTHOPEDIC SURGERY | Facility: HOSPITAL | Age: 69
End: 2024-12-19
Payer: MEDICARE

## 2024-12-19 NOTE — TELEPHONE ENCOUNTER
Attempted to reach Ms. Morris to see how she has been doing since her LTK 11/22. Message left at this time.

## 2024-12-20 ENCOUNTER — TREATMENT (OUTPATIENT)
Dept: PHYSICAL THERAPY | Facility: CLINIC | Age: 69
End: 2024-12-20
Payer: MEDICARE

## 2024-12-20 DIAGNOSIS — G89.18 POSTOPERATIVE PAIN OF LEFT KNEE: Primary | ICD-10-CM

## 2024-12-20 DIAGNOSIS — M25.662 DECREASED RANGE OF MOTION (ROM) OF LEFT KNEE: ICD-10-CM

## 2024-12-20 DIAGNOSIS — Z96.652 S/P TKR (TOTAL KNEE REPLACEMENT), LEFT: ICD-10-CM

## 2024-12-20 DIAGNOSIS — M25.562 POSTOPERATIVE PAIN OF LEFT KNEE: Primary | ICD-10-CM

## 2024-12-20 PROCEDURE — 97530 THERAPEUTIC ACTIVITIES: CPT | Performed by: PHYSICAL THERAPIST

## 2024-12-20 PROCEDURE — 97110 THERAPEUTIC EXERCISES: CPT | Performed by: PHYSICAL THERAPIST

## 2024-12-20 NOTE — PROGRESS NOTES
Physical Therapy Daily Treatment Note    Patient: Moriah Morris   : 1955  Referring practitioner: Jason Minaya MD  Date of Initial Visit: Type: THERAPY  Noted: 2024  Today's Date: 2024  Patient seen for 6 sessions       Visit Diagnoses:    ICD-10-CM ICD-9-CM   1. Postoperative pain of left knee  G89.18 338.18    M25.562 719.46   2. Decreased range of motion (ROM) of left knee  M25.662 719.56   3. S/P TKR (total knee replacement), left  Z96.652 V43.65       Moriah Morris reports: her knee is more sore this am than usual.  Pt continues to toss and turn at night due to increased knee pain.      Subjective       Objective          Active Range of Motion   Left Knee   Flexion: 122 degrees   Extension: 0 degrees       See Exercise, Manual, and Modality Logs for complete treatment.       Assessment & Plan       Assessment  Assessment details: Pt is responding to treatment with improving ROM and strength.  Pt reached 0-122 degrees of knee AROM without needing passive stretching.  Added 2# to leg lifts, increased weight with B leg press and increased step ups to an 8 in step.  Pt completed all stretches and strengthening exercises well.  Ended treatment with cold pack to decrease post exercise soreness.  Will continue to see pt 2-3x/week for stretching, strengthening and modalities PRN for pain.          Progress per Plan of Care      Timed:         Manual Therapy:         mins  35280;     Therapeutic Exercise:   18      mins  78923;     Neuromuscular Pearl:        mins  15189;    Therapeutic Activity:    12      mins  06679;     Gait Training:           mins  72248;     Ultrasound:          mins  34444;    Ionto                                   mins   66595  Self Care                            mins   38831  Canalith Repos         mins 53867      Un-Timed:  Electrical Stimulation:         mins  85712 ( );  Dry Needling          mins self-pay  Traction          mins 94009      Timed Treatment:    30   mins   Total Treatment:    67    mins    Emely Lieberman, PT  [place] License: [id number]

## 2024-12-23 ENCOUNTER — TREATMENT (OUTPATIENT)
Dept: PHYSICAL THERAPY | Facility: CLINIC | Age: 69
End: 2024-12-23
Payer: MEDICARE

## 2024-12-23 DIAGNOSIS — G89.18 POSTOPERATIVE PAIN OF LEFT KNEE: Primary | ICD-10-CM

## 2024-12-23 DIAGNOSIS — R29.898 WEAKNESS OF LEFT LEG: ICD-10-CM

## 2024-12-23 DIAGNOSIS — M25.662 DECREASED RANGE OF MOTION (ROM) OF LEFT KNEE: ICD-10-CM

## 2024-12-23 DIAGNOSIS — Z96.652 S/P TKR (TOTAL KNEE REPLACEMENT), LEFT: ICD-10-CM

## 2024-12-23 DIAGNOSIS — M25.562 POSTOPERATIVE PAIN OF LEFT KNEE: Primary | ICD-10-CM

## 2024-12-23 PROCEDURE — 97530 THERAPEUTIC ACTIVITIES: CPT | Performed by: PHYSICAL THERAPIST

## 2024-12-23 PROCEDURE — 97112 NEUROMUSCULAR REEDUCATION: CPT | Performed by: PHYSICAL THERAPIST

## 2024-12-23 PROCEDURE — 97110 THERAPEUTIC EXERCISES: CPT | Performed by: PHYSICAL THERAPIST

## 2024-12-23 NOTE — PROGRESS NOTES
Physical Therapy Daily Treatment Note    [facility]  [address]  [place] [address]  Dept: [phone]  Dept Fax: [phone]  Loc: [phone]  Loc Fax: [phone]     Patient: Moriah Morris   : 1955  Diagnosis/ICD-10 Code:  Postoperative pain of left knee [G89.18, M25.562]  Referring practitioner: Jason Minaya MD  Today's Date: 2024  Patient seen for 7 sessions           Subjective Doing well, just ready for it to feel better. It's rough at night. 3/10.     Objective   See Exercise, Manual, and Modality Logs for complete treatment.   Pt is almost 5 weeks out.   L Knee AROM 118 deg  PROM extension -10deg.     Assessment/Plan Pt ambulating without AD with very mild gait deficit. Progressing well with strengthening.            Timed:    Manual Therapy:         mins  81981;  Therapeutic Exercise:    10     mins  16595;     Neuromuscular Pearl:    12    mins  25712;    Therapeutic Activity:     16     mins  96254;     Gait Training:           mins  54316;     Ultrasound:          mins  10789;    Electrical Stimulation:         mins  11679 ( );  Iontophoresis         mins 71038;  Aquatic Therapy         mins 92811;      Untimed:  Electrical Stimulation:         mins  03230 ( );  Traction:         mins  09074;   Dry Needling  (1-2 muscles)                 mins  (Self-pay)  Dry Needling (3-4 muscles)       (Self-pay)  Dry Needling Trial         DRYNDLTRIAL  (No Charge)    Timed Treatment:   38   mins   Total Treatment:     50   mins    Gloria Kaiser, PT  Physical Therapist    [place] License:[id number]

## 2024-12-26 ENCOUNTER — TREATMENT (OUTPATIENT)
Dept: PHYSICAL THERAPY | Facility: CLINIC | Age: 69
End: 2024-12-26
Payer: MEDICARE

## 2024-12-26 DIAGNOSIS — R29.898 WEAKNESS OF LEFT LEG: ICD-10-CM

## 2024-12-26 DIAGNOSIS — G89.18 POSTOPERATIVE PAIN OF LEFT KNEE: Primary | ICD-10-CM

## 2024-12-26 DIAGNOSIS — Z96.652 S/P TKR (TOTAL KNEE REPLACEMENT), LEFT: ICD-10-CM

## 2024-12-26 DIAGNOSIS — M25.662 DECREASED RANGE OF MOTION (ROM) OF LEFT KNEE: ICD-10-CM

## 2024-12-26 DIAGNOSIS — M25.562 POSTOPERATIVE PAIN OF LEFT KNEE: Primary | ICD-10-CM

## 2024-12-26 NOTE — PROGRESS NOTES
Physical Therapy Daily Treatment Note      Patient: Moriah Morris   : 1955  Referring practitioner: Jason Minaya MD  Date of Initial Visit: Type: THERAPY  Noted: 2024  Today's Date:  2024  Patient seen for 8 sessions         Moriah Morris reports: hosted [place] last night; on her feet more than normal              Objective     L knee AAROM flexion: 121 degrees  L knee AROM ext: 0 degrees    See Exercise, Manual, and Modality Logs for complete treatment.       Assessment/Plan  Pt is 5 weeks post op L TKA. Pt with continued improvement in L knee ROM, no need for passive stretching today despite increased upright ax over the holidays. Increased strength with increased resistance with TKE and HS curls. No antalgia noted during gait. Discussed increasing her time on her stationary bike at home up to 20 min as tolerated; no pain or soreness with doing 15 min intervals. Ice post tx to decrease pain, swelling or potential soreness.        Progress per Plan of Care and Progress strengthening /stabilization /functional activity           Timed:  Manual Therapy:    -     mins  13243;  Therapeutic Exercise:    30     mins  65598;     Neuromuscular Pearl:    -    mins  01849;    Therapeutic Activity:     10     mins  12017;     Gait Training:      -     mins  26362;     Ultrasound:     -     mins  23640;      Untimed:  Electrical Stimulation:    -     mins  15356 ( );  Mechanical Traction:    -     mins  25147;   Dry needling:      -     mins  88199/    Timed Treatment:   40   mins   Total Treatment:     50   mins  Jen Salvador PT  Physical Therapist    License #: [id number]

## 2024-12-30 ENCOUNTER — TREATMENT (OUTPATIENT)
Dept: PHYSICAL THERAPY | Facility: CLINIC | Age: 69
End: 2024-12-30
Payer: MEDICARE

## 2024-12-30 DIAGNOSIS — Z98.890 S/P KNEE SURGERY: ICD-10-CM

## 2024-12-30 DIAGNOSIS — M25.662 DECREASED RANGE OF MOTION (ROM) OF LEFT KNEE: ICD-10-CM

## 2024-12-30 DIAGNOSIS — G89.18 POSTOPERATIVE PAIN OF LEFT KNEE: Primary | ICD-10-CM

## 2024-12-30 DIAGNOSIS — R29.898 WEAKNESS OF LEFT LEG: ICD-10-CM

## 2024-12-30 DIAGNOSIS — M25.562 POSTOPERATIVE PAIN OF LEFT KNEE: Primary | ICD-10-CM

## 2024-12-30 DIAGNOSIS — Z96.652 S/P TKR (TOTAL KNEE REPLACEMENT), LEFT: ICD-10-CM

## 2024-12-30 PROCEDURE — 97112 NEUROMUSCULAR REEDUCATION: CPT | Performed by: PHYSICAL THERAPIST

## 2024-12-30 PROCEDURE — 97110 THERAPEUTIC EXERCISES: CPT | Performed by: PHYSICAL THERAPIST

## 2024-12-30 PROCEDURE — 97530 THERAPEUTIC ACTIVITIES: CPT | Performed by: PHYSICAL THERAPIST

## 2024-12-30 NOTE — PROGRESS NOTES
Physical Therapy Daily Treatment Note  [facility]  [address] [place] [address]  [phone] (phone)  [phone] (fax)    Patient: Moriah Morris   : 1955  Diagnosis/ICD-10 Code:  Postoperative pain of left knee [G89.18, M25.562]  Referring practitioner: Jason Minaya MD  Date of Initial Visit: Type: THERAPY  Noted: 2024  Today's Date: 2024  Patient seen for 9 sessions       Moriah Morris reports: in pain today (L knee). Rode bike for 20 minutes at home this morning. I think its the way I sleep that hurts.     Subjective     Objective   See Exercise, Manual, and Modality Logs for complete treatment.       Assessment/Plan  Subjectively, pt reports no increase of pain or discomfort with interventions performed today. Performed well with continued functional strengthening and mobility interventions. Continues to demonstrate fatigue with exercise in clinic with most difficulty with step down activity demonstrating lack of L quad eccentric control. Continues to benefit from verbal/tactile cues to ensure proper form and technique for exercise performance.     Progress per Plan of Care           Manual Therapy:         mins  09661;  Therapeutic Exercise:    20     mins  29311;     Neuromuscular Pearl:    8    mins  27808;    Therapeutic Activity:     10     mins  25821;     Gait Training:           mins  77884;     Ultrasound:          mins  54409;    Electrical Stimulation:         mins  72975;  Traction          mins 46789    Timed Treatment:   38   mins   Total Treatment:     48   mins    Maureen Faustin PTA  Physical Therapist Assistant [id number]

## 2024-12-31 RX ORDER — HYDROCODONE BITARTRATE AND ACETAMINOPHEN 7.5; 325 MG/1; MG/1
1 TABLET ORAL EVERY 4 HOURS PRN
Qty: 10 TABLET | Refills: 0 | Status: SHIPPED | OUTPATIENT
Start: 2024-12-31

## 2025-01-02 ENCOUNTER — TREATMENT (OUTPATIENT)
Dept: PHYSICAL THERAPY | Facility: CLINIC | Age: 70
End: 2025-01-02
Payer: MEDICARE

## 2025-01-02 DIAGNOSIS — Z96.652 S/P TKR (TOTAL KNEE REPLACEMENT), LEFT: ICD-10-CM

## 2025-01-02 DIAGNOSIS — M25.562 POSTOPERATIVE PAIN OF LEFT KNEE: Primary | ICD-10-CM

## 2025-01-02 DIAGNOSIS — R29.898 WEAKNESS OF LEFT LEG: ICD-10-CM

## 2025-01-02 DIAGNOSIS — M25.662 DECREASED RANGE OF MOTION (ROM) OF LEFT KNEE: ICD-10-CM

## 2025-01-02 DIAGNOSIS — G89.18 POSTOPERATIVE PAIN OF LEFT KNEE: Primary | ICD-10-CM

## 2025-01-02 NOTE — LETTER
[facility] [place]  [address] [address]                                                                                Phone [phone]                                                                                  Fax [phone]    Physical Therapy Reassessment/10 visit   Patient: Moriah Morris   : 1955  Diagnosis/ICD-10 Code:  Postoperative pain of left knee [G89.18, M25.562]  Referring practitioner: Jason Minaya MD  NPI: [id number]                                      Date of Initial Visit:  Type: THERAPY  Noted: 2024  Today's Date: 2025  Patient seen for 10 sessions         Visit Diagnoses:    ICD-10-CM ICD-9-CM   1. Postoperative pain of left knee  G89.18 338.18    M25.562 719.46   2. Decreased range of motion (ROM) of left knee  M25.662 719.56   3. Weakness of left leg  R29.898 729.89   4. S/P TKR (total knee replacement), left  Z96.652 V43.65         Subjective Questionnaire: LEFS:   Clinical Progress: improved  Home Program Compliance: Yes  Treatment has included: therapeutic exercise, manual therapy, therapeutic activity, and cryotherapy      Subjective   Moriah Morris reports: My knee is doing well and hope it's ok that I tapered my appts to once/week since I've been consistent with my HEP twice per day.  I     Objective          Observations   Left Knee   Negative for drainage and incision.       Active Range of Motion   Left Knee   Flexion: 121 degrees   Extension: 0 degrees   Extensor la degrees     Strength/Myotome Testing     Left Hip   Planes of Motion   Flexion: 5  Abduction: 5    Left Knee   Flexion: 5  Extension: 5  Quadriceps contraction: good    Tests   Left Ankle/Foot   Negative for Homans' sign.     Swelling     Left Knee Girth Measurement (cm)   Joint line: 40.8.    Right Knee Girth Measurement (cm)   Joint line: 40.    Ambulation   Weight-Bearing Status   Assistive device used: none    Ambulation: Stairs  "  Pattern: reciprocal  Railings: two rails  Descend stairs: independent  Pattern: reciprocal  Railings: two rails    Additional Stairs Ambulation Details  Mild L knee pain descending the 6 in therapy staircase    Observational Gait   Gait: within functional limits     Functional Assessment     Forward Step Up 8\"   Left Leg  No pain.     Single Leg Stance   Left: 30 seconds    Comments  5TSTS - 7.84 sec           Assessment/Plan  Moriah has made excellent progress in physical therapy with ROM, gait and balance s/p L TKA on 11/22/24.  She was able to reciprocally descend our 6 in therapy staircase though descends her home staircase cautiously and non-reciprocally.  Her calf is soft and non tender and jt effusion is appropriate. She is diligent and compliant with her HEP and should continue to progress with weekly PT appts until her next post op visit.     Goals  Plan Goals: STG: ~6 weeks  1. Pt will be demonstrate 0-120 degrees of knee extension and flexion to be able to sit in a chair properly - MET  2. Pt will improve LEFS score to 40/80 to improve subjective function of LE with ADLs - MET  3. Pt will be able to walk for 20 minutes to be able to navigate grocery store  - MET  4. Pt will demonstrate a SLR without extensor lag to improve terminal knee extension - MET     LTG: ~12 weeks  1. Pt will decreased L knee joint swelling from 41 cm to 38 cm - ONGOING - measured at mid patella  2. Pt will be able to complete basement steps with single HR and reciprocal pattern ascending/descending - ONGOING  3. Pt will be able to walk for 30 minutes  or 1 mile with L knee pain <2/10 - ONGOING  4. Pt will improve knee extensor and knee flexor strength to 5/5 to be able to complete transfers without UE assistance - MET  5. Pt will be I with HEP - MET     Recommendations: Continue with recommendations ok to taper to once a week until post op visit on 1/17/25 and assess for possible discharge  Timeframe:  3-4 weeks  Prognosis to " achieve goals: good      Timed:         Manual Therapy:         mins  12861;     Therapeutic Exercise:   30      mins  39213;     Neuromuscular Pearl:        mins  08026;    Therapeutic Activity:    20      mins  11602;     Gait Training:           mins  47888;     Ultrasound:          mins  37503;    Ionto                                   mins   14326  Self Care                            mins   95425      Timed Treatment: 50     mins   Total Treatment:    60    mins          PT: Keya Stuart PT     [place] License:  [id number]    Electronically signed by Keya Stuart PT, 1/2/2025,3:35 PM EST    Certification Period: 1/2/2025 thru 4/1/2025  I certify that the therapy services are furnished while this patient is under my care.  The services outlined above are required by this patient, and will be reviewed every 90 days.         Physician Signature:__________________________________________________    PHYSICIAN: Jason Minaya MD  NPI: [id number]                                      DATE:  :     Please sign and return via fax to [phone].  Thank you, [facility]

## 2025-01-07 ENCOUNTER — TELEPHONE (OUTPATIENT)
Dept: PHYSICAL THERAPY | Facility: OTHER | Age: 70
End: 2025-01-07
Payer: MEDICARE

## 2025-01-07 NOTE — TELEPHONE ENCOUNTER
Caller: Moriah Morris    Relationship: Self    What was the call regarding: PATIENT CANCELLED TODAYS APPT BECAUSE OF THE WEATHER

## 2025-01-14 ENCOUNTER — TREATMENT (OUTPATIENT)
Dept: PHYSICAL THERAPY | Facility: CLINIC | Age: 70
End: 2025-01-14
Payer: MEDICARE

## 2025-01-14 DIAGNOSIS — Z96.652 S/P TKR (TOTAL KNEE REPLACEMENT), LEFT: ICD-10-CM

## 2025-01-14 DIAGNOSIS — M25.562 POSTOPERATIVE PAIN OF LEFT KNEE: Primary | ICD-10-CM

## 2025-01-14 DIAGNOSIS — G89.18 POSTOPERATIVE PAIN OF LEFT KNEE: Primary | ICD-10-CM

## 2025-01-14 DIAGNOSIS — R29.898 WEAKNESS OF LEFT LEG: ICD-10-CM

## 2025-01-14 DIAGNOSIS — M25.662 DECREASED RANGE OF MOTION (ROM) OF LEFT KNEE: ICD-10-CM

## 2025-01-14 PROCEDURE — 97110 THERAPEUTIC EXERCISES: CPT | Performed by: PHYSICAL THERAPIST

## 2025-01-14 PROCEDURE — 97530 THERAPEUTIC ACTIVITIES: CPT | Performed by: PHYSICAL THERAPIST

## 2025-01-14 NOTE — PROGRESS NOTES
Physical Therapy Daily Treatment Note    Patient: Moriah Morris   : 1955  Referring practitioner: Jason Minaya MD  Date of Initial Visit: Type: THERAPY  Noted: 2024  Today's Date: 2025  Patient seen for 11 sessions       Visit Diagnoses:    ICD-10-CM ICD-9-CM   1. Postoperative pain of left knee  G89.18 338.18    M25.562 719.46   2. Decreased range of motion (ROM) of left knee  M25.662 719.56   3. Weakness of left leg  R29.898 729.89   4. S/P TKR (total knee replacement), left  Z96.652 V43.65       Moriah Morris reports: she is sleeping better.      Subjective       Objective          Active Range of Motion   Left Knee   Flexion: 123 degrees   Extension: 0 degrees     Additional Active Range of Motion Details  Pt reached full knee ext after passive stretching       See Exercise, Manual, and Modality Logs for complete treatment.       Assessment & Plan       Assessment  Assessment details: Pt is responding to treatment with improving ROM and strength.  Pt reached 0-123 degrees of knee AROM with minimal passive stretching into knee extension.  Pt completed all stretches and strengthening exercises well.  Increased weight to 3# with leg lifts, silver TB with knee flex and TKE, and increased step downs to a 8 in step.  Ended treatment with cold pack to decrease post exercise soreness.  Will continue to see pt 1-2x/week for stretching, strengthening and modalities PRN for pain.          Progress per Plan of Care      Timed:         Manual Therapy:    3     mins  03333;     Therapeutic Exercise:   17      mins  54388;     Neuromuscular Pearl:        mins  05340;    Therapeutic Activity:    15      mins  36294;     Gait Training:           mins  85818;     Ultrasound:          mins  20445;    Ionto                                   mins   21969  Self Care                            mins   63136  Canalith Repos         mins 21159      Un-Timed:  Electrical Stimulation:         mins  88046 (  );  Dry Needling          mins self-pay  Traction          mins 63684      Timed Treatment:  35    mins   Total Treatment:     50   mins    Emely Liebemran, PT  [place] License: [id number]

## 2025-01-17 ENCOUNTER — OFFICE VISIT (OUTPATIENT)
Dept: ORTHOPEDIC SURGERY | Facility: CLINIC | Age: 70
End: 2025-01-17
Payer: MEDICARE

## 2025-01-17 VITALS — HEIGHT: 60 IN | TEMPERATURE: 98.4 F | BODY MASS INDEX: 31.24 KG/M2 | WEIGHT: 159.1 LBS

## 2025-01-17 DIAGNOSIS — Z96.652 STATUS POST TOTAL LEFT KNEE REPLACEMENT: ICD-10-CM

## 2025-01-17 DIAGNOSIS — M17.11 PRIMARY OSTEOARTHRITIS OF RIGHT KNEE: ICD-10-CM

## 2025-01-17 DIAGNOSIS — R52 PAIN: Primary | ICD-10-CM

## 2025-01-17 RX ORDER — CEPHALEXIN 500 MG/1
CAPSULE ORAL
Qty: 4 CAPSULE | Refills: 5 | Status: SHIPPED | OUTPATIENT
Start: 2025-01-17

## 2025-01-17 NOTE — PROGRESS NOTES
Moriah Morris : 1955 MRN: [id number] DATE: 2025    DIAGNOSIS: 8 week follow up left total knee      SUBJECTIVE:Patient returns today for 8 week follow up of left total knee replacement. Patient reports overall she is doing well with no unusual complaints.  States that she still having a moderate amount of pain and rates it as a 4 or 5 on a scale of 10.  States that she is still going to outpatient physical therapy with [facility] weekly as well as doing her home exercises.  Appears to be progressing appropriately.  She denies any instability or buckling issues.  Denies any sign or symptoms of infection.  Patient does report that she is scheduled for her right knee to be replaced on  but due to the current symptoms and where she is at in recovery she does not think that she will be ready for surgery.    OBJECTIVE:   Exam:. The incision is well healed. No sign of infection. Range of motion is measured at 1 to 123. The calf is soft and nontender with a negative Homans sign. Strength is progressing and the patient is ambulating appropriately.    DIAGNOSTIC STUDIES  Xrays: 3 views of the left knee (AP, lateral, and sunrise) were ordered and reviewed for evaluation of recent knee replacement. They demonstrate a well positioned, well aligned knee replacement without complicating factors noted. In comparison with previous films there has been no change.    ASSESSMENT: 8 week status post left knee replacement.    PLAN:     Treatment option as well as imaging results were discussed in detail with the patient.  In regards to the patient's left knee she can continue activities as tolerated.  Continue home physical therapy exercises.  She can follow back up with us in 10 months for annual visit.  In regards to the patient's right knee we will proceed forward with canceling her surgery.  Patient states that she is having some instability issues related to the right knee. OA web  medial/lateral was fit  for stability during ambulation to offload compartment pressure.  Will give us a call when she is ready to get back on the surgery schedule for the right knee.      Agustín Gtz, APRN  1/17/2025

## 2025-01-20 ENCOUNTER — TREATMENT (OUTPATIENT)
Dept: PHYSICAL THERAPY | Facility: CLINIC | Age: 70
End: 2025-01-20
Payer: MEDICARE

## 2025-01-20 DIAGNOSIS — R29.898 WEAKNESS OF LEFT LEG: ICD-10-CM

## 2025-01-20 DIAGNOSIS — M25.562 POSTOPERATIVE PAIN OF LEFT KNEE: Primary | ICD-10-CM

## 2025-01-20 DIAGNOSIS — G89.18 POSTOPERATIVE PAIN OF LEFT KNEE: Primary | ICD-10-CM

## 2025-01-20 DIAGNOSIS — M25.662 DECREASED RANGE OF MOTION (ROM) OF LEFT KNEE: ICD-10-CM

## 2025-01-20 DIAGNOSIS — Z96.652 S/P TKR (TOTAL KNEE REPLACEMENT), LEFT: ICD-10-CM

## 2025-01-20 PROCEDURE — 97530 THERAPEUTIC ACTIVITIES: CPT | Performed by: PHYSICAL THERAPIST

## 2025-01-20 PROCEDURE — 97110 THERAPEUTIC EXERCISES: CPT | Performed by: PHYSICAL THERAPIST

## 2025-01-20 NOTE — PROGRESS NOTES
Physical Therapy Daily Treatment Note/Discharge Note     Patient: Moriah Morris   : 1955  Referring practitioner: Jason Minaya MD  Date of Initial Visit: Type: THERAPY  Noted: 9/10/2024  Today's Date: 2025  Patient seen for 3 sessions       Visit Diagnoses:    ICD-10-CM ICD-9-CM   1. Postoperative pain of left knee  G89.18 338.18    M25.562 719.46   2. Weakness of left leg  R29.898 729.89   3. S/P TKR (total knee replacement), left  Z96.652 V43.65   4. Decreased range of motion (ROM) of left knee  M25.662 719.56       Moriah Morris reports: her left knee pain today is a 3/10 described as aching.  Pt rates her knee pain on a daily average a 2-3/10.  Pt saw MD last week and he said she can do her therapy at home.  Pt also cancelled her surgery for her right knee due to not being ready.      Subjective       Objective          Active Range of Motion   Left Knee   Flexion: 123 degrees   Extension: 0 degrees     Strength/Myotome Testing     Left Hip   Planes of Motion   Flexion: 5  Abduction: 4+  Adduction: 5    Left Knee   Flexion: 5  Extension: 5    Swelling     Left Knee Girth Measurement (cm)   Joint line: 37.5 cm  10 cm above joint line: SP 41.3.  10 cm below joint line: IP 35.1.    Ambulation     Observational Gait   Gait: within functional limits   Walking speed, stride length, left stance time, left swing time and left step length within functional limits.   Left foot contact pattern: heel to toe    Additional Observational Gait Details  Pt ambulates without an antalgic gait or AD left LE getting good heel strike to toe off gait pattern.        See Exercise, Manual, and Modality Logs for complete treatment.       Assessment & Plan       Assessment  Assessment details: Pt is doing well with therapy.  Pt has good ROM, strength, decreasing pain, swelling, and improved functional mobility.  Pt has met all STGs and 4/5 LTGs.  Pt is independent and compliant with her HEP.  Pt tolerates all stretches and  strengthening exercises.  Feel pt can continue managing her symptoms at home with continuing her HEP at least 2-3x/week.  Discharged pt today after treatment.             Goals  Plan Goals: STG: ~6 weeks  1. Pt will be demonstrate 0-120 degrees of knee extension and flexion to be able to sit in a chair properly - MET  2. Pt will improve LEFS score to 40/80 to improve subjective function of LE with ADLs - MET  3. Pt will be able to walk for 20 minutes to be able to navigate grocery store  - MET  4. Pt will demonstrate a SLR without extensor lag to improve terminal knee extension - MET     LTG: ~12 weeks  1. Pt will decreased L knee joint swelling from 41 cm to 38 cm - MET measured at mid patella  2. Pt will be able to complete basement steps with single HR and reciprocal pattern ascending/descending - MET, BUT PAINFUL.  Pt goes up and down 13 STEPS  3. Pt will be able to walk for 30 minutes  or 1 mile with L knee pain <2/10  PROGRESSING    4. Pt will improve knee extensor and knee flexor strength to 5/5 to be able to complete transfers without UE assistance - MET  5. Pt will be I with HEP - MET       Discharged       Timed:         Manual Therapy:         mins  75990;     Therapeutic Exercise:    25     mins  84599;     Neuromuscular Pearl:        mins  86488;    Therapeutic Activity:    15      mins  04990;     Gait Training:           mins  00313;     Ultrasound:          mins  81425;    Ionto                                   mins   56278  Self Care                            mins   56561  Canalith Repos         mins 89797      Un-Timed:  Electrical Stimulation:         mins  05292 (MC );  Dry Needling          mins self-pay  Traction          mins 93308      Timed Treatment:  40    mins   Total Treatment:     55   mins    Emely Lieberman, PT  [place] License: [id number]

## 2025-03-20 DIAGNOSIS — M17.12 ARTHRITIS OF LEFT KNEE: ICD-10-CM

## 2025-03-21 RX ORDER — CELECOXIB 200 MG/1
200 CAPSULE ORAL 2 TIMES DAILY
Qty: 60 CAPSULE | Refills: 0 | OUTPATIENT
Start: 2025-03-21

## 2025-03-24 ENCOUNTER — TELEPHONE (OUTPATIENT)
Dept: INTERNAL MEDICINE | Facility: CLINIC | Age: 70
End: 2025-03-24
Payer: MEDICARE

## 2025-03-24 NOTE — TELEPHONE ENCOUNTER
The patient came in and set up a wellness visit, 4/9. She also asked about her Celecoxib medicine. I told her it was denied because she hadn't been in in a year. But now that she has an appt scheduled we can send it.

## 2025-04-08 RX ORDER — CELECOXIB 200 MG/1
200 CAPSULE ORAL
COMMUNITY
Start: 2024-10-22 | End: 2025-04-09 | Stop reason: SDUPTHER

## 2025-04-09 ENCOUNTER — OFFICE VISIT (OUTPATIENT)
Dept: FAMILY MEDICINE CLINIC | Facility: CLINIC | Age: 70
End: 2025-04-09
Payer: MEDICARE

## 2025-04-09 VITALS
DIASTOLIC BLOOD PRESSURE: 76 MMHG | WEIGHT: 163 LBS | OXYGEN SATURATION: 96 % | BODY MASS INDEX: 32 KG/M2 | HEART RATE: 64 BPM | SYSTOLIC BLOOD PRESSURE: 128 MMHG | TEMPERATURE: 97.3 F | HEIGHT: 60 IN

## 2025-04-09 DIAGNOSIS — E55.9 VITAMIN D DEFICIENCY: ICD-10-CM

## 2025-04-09 DIAGNOSIS — E78.2 MIXED HYPERLIPIDEMIA: ICD-10-CM

## 2025-04-09 DIAGNOSIS — R73.03 PREDIABETES: ICD-10-CM

## 2025-04-09 DIAGNOSIS — M17.11 PRIMARY OSTEOARTHRITIS OF RIGHT KNEE: Primary | ICD-10-CM

## 2025-04-09 RX ORDER — CELECOXIB 200 MG/1
200 CAPSULE ORAL 2 TIMES DAILY
Qty: 180 CAPSULE | Refills: 1 | Status: SHIPPED | OUTPATIENT
Start: 2025-04-09

## 2025-04-09 NOTE — PROGRESS NOTES
"  Subjective   Moriah Morris is a 69 y.o. female who is here for   Chief Complaint   Patient presents with    Establish Care     No complaints     .     History of Present Illness   History of Present Illness  Moriah Morris presents to the office to establish care.  Patient denies any acute issues.  Patient does have longstanding history of osteoarthritis of bilateral knees.  Patient has previously underwent left knee replacement.  Patient is scheduled to have right knee replaced in September 2025.  She is currently on Celebrex 200 mg twice daily.  Patient denies any other acute issues at this time.    Review of Systems   Constitutional:  Negative for activity change, appetite change, chills, fatigue and fever.   Respiratory:  Negative for cough and shortness of breath.    Cardiovascular:  Negative for chest pain and leg swelling.   Gastrointestinal:  Negative for abdominal pain.       Objective   Vitals:    04/09/25 1012   BP: 128/76   BP Location: Left arm   Patient Position: Sitting   Cuff Size: Adult   Pulse: 64   Temp: 97.3 °F (36.3 °C)   SpO2: 96%   Weight: 73.9 kg (163 lb)   Height: 152.4 cm (60\")      Physical Exam  Vitals and nursing note reviewed.   Constitutional:       Appearance: Normal appearance. She is obese.   HENT:      Head: Normocephalic and atraumatic.   Cardiovascular:      Rate and Rhythm: Normal rate and regular rhythm.      Pulses: Normal pulses.      Heart sounds: No murmur heard.  Pulmonary:      Effort: Pulmonary effort is normal. No respiratory distress.      Breath sounds: Normal breath sounds. No wheezing.   Skin:     General: Skin is warm and dry.   Neurological:      General: No focal deficit present.      Mental Status: She is alert.   Psychiatric:         Mood and Affect: Mood normal.         Thought Content: Thought content normal.       Physical Exam        Assessment & Plan   Assessment & Plan    Diagnoses and all orders for this visit:    1. Primary osteoarthritis of right " knee (Primary)  Continue Celebrex 200 mg twice daily.  Will obtain baseline labs.  -     celecoxib (CeleBREX) 200 MG capsule; Take 1 capsule by mouth 2 (Two) Times a Day.  Dispense: 180 capsule; Refill: 1    2. Prediabetes  Check hemoglobin A1c  -     TSH Rfx On Abnormal To Free T4; Future  -     CBC & Differential; Future  -     Comprehensive Metabolic Panel; Future  -     Hemoglobin A1c; Future    3. Vitamin D deficiency  Will check vitamin D.  -     Vitamin D 25 hydroxy; Future    4. Mixed hyperlipidemia  Will check lipid panel.  -     Lipid Panel; Future      Results      There are no Patient Instructions on file for this visit.    Medications Discontinued During This Encounter   Medication Reason    acetaminophen (TYLENOL) 500 MG tablet *Therapy completed    cephalexin (KEFLEX) 500 MG capsule Historical Med - Therapy completed    celecoxib (CeleBREX) 200 MG capsule Reorder        Return in about 4 weeks (around 5/7/2025) for Medicare Wellness.         Matthew Snowden MD  [place], [place]

## 2025-04-11 ENCOUNTER — PATIENT ROUNDING (BHMG ONLY) (OUTPATIENT)
Dept: FAMILY MEDICINE CLINIC | Facility: CLINIC | Age: 70
End: 2025-04-11
Payer: MEDICARE

## 2025-04-11 NOTE — PROGRESS NOTES
April 11, 2025    My name is Sri and I am with [facility]  [address] [address] [phone].    I would like to start by welcoming you to our practice.     Tell me about your visit with us.   What things went well?      We're always looking for ways to make our patients' experiences even better.   Do you have recommendations on ways we may improve?     Overall were you satisfied with your first visit to our practice?     Is there anything else I can do for you?     Thank you, and have a great day.

## 2025-04-17 ENCOUNTER — TELEPHONE (OUTPATIENT)
Dept: ORTHOPEDIC SURGERY | Facility: CLINIC | Age: 70
End: 2025-04-17
Payer: MEDICARE

## 2025-04-17 NOTE — TELEPHONE ENCOUNTER
"Patient LVM on surgery scheduling line stating she would like to move forward with scheduling Rt TKA. Patient was last seen 1/17/25 for LT TKA 8 week post-op. It was decided at that time to postpone Rt knee replacement. Case request was \"discontinued\" at that time by scheduling. Patient is wanting to schedule Rt TKA for end of September.     I returned her call and relayed via voicemail that she will need to be seen in office for new case request. Okay to relay and schedule with Dr. Minaya, Agustín Gtz, or Diann Moore first available.   "

## 2025-04-24 ENCOUNTER — OFFICE VISIT (OUTPATIENT)
Dept: ORTHOPEDIC SURGERY | Facility: CLINIC | Age: 70
End: 2025-04-24
Payer: MEDICARE

## 2025-04-24 VITALS — HEIGHT: 60 IN | BODY MASS INDEX: 32.24 KG/M2 | WEIGHT: 164.2 LBS | TEMPERATURE: 97.1 F

## 2025-04-24 DIAGNOSIS — R52 PAIN: Primary | ICD-10-CM

## 2025-04-24 DIAGNOSIS — M17.11 PRIMARY OSTEOARTHRITIS OF RIGHT KNEE: ICD-10-CM

## 2025-04-24 PROCEDURE — 99214 OFFICE O/P EST MOD 30 MIN: CPT | Performed by: NURSE PRACTITIONER

## 2025-04-24 RX ORDER — MELOXICAM 7.5 MG/1
15 TABLET ORAL ONCE
OUTPATIENT
Start: 2025-04-24 | End: 2025-04-24

## 2025-04-24 RX ORDER — CHLORHEXIDINE GLUCONATE 500 MG/1
CLOTH TOPICAL ONCE
OUTPATIENT
Start: 2025-04-24

## 2025-04-24 RX ORDER — PREGABALIN 150 MG/1
150 CAPSULE ORAL ONCE
OUTPATIENT
Start: 2025-04-24 | End: 2025-04-24

## 2025-04-24 NOTE — PROGRESS NOTES
"Patient: Moriah Morris  YOB: 1955 69 y.o. female  Medical Record Number: [id number]    Chief Complaints:   Chief Complaint   Patient presents with    Right Knee - Follow-up, Pain       History of Present Illness:Moriah Morris is a 69 y.o. female who presents for follow-up of right knee pain that is chronic in nature.  Patient has known advanced right knee osteoarthritis that is bone-on-bone.  We have been conservatively treating the patient with physical therapy, NSAID therapy, and intra-articular joint injections.  Patient reports that conservative measures are no longer helping and her symptoms have progressively worsened.  She has constant globalized anterior knee discomfort and rates it as a 7 on a scale of 10.  Symptoms are worse with prolonged walking, standing, going up and down flights of stairs.  Symptoms are slightly improved by taking Celebrex and the RICE method.  Patient reports that this is starting to greatly affect her quality of life where she is becoming less active and is ready to proceed forward with surgical intervention at this time.    Allergies: No Known Allergies    Medications:   Current Outpatient Medications   Medication Sig Dispense Refill    celecoxib (CeleBREX) 200 MG capsule Take 1 capsule by mouth 2 (Two) Times a Day. 180 capsule 1    Multiple Vitamins-Minerals (PRESERVISION AREDS 2 PO) Take  by mouth.      vitamin E 200 UNIT capsule Take 1 capsule by mouth Daily.       No current facility-administered medications for this visit.         The following portions of the patient's history were reviewed and updated as appropriate: allergies, current medications, past family history, past medical history, past social history, past surgical history and problem list.    Review of Systems:   Pertinent positives/negative listed in HPI above    Physical Exam:   Vitals:    04/24/25 1416   Temp: 97.1 °F (36.2 °C)   Weight: 74.5 kg (164 lb 3.2 oz)   Height: 152.4 cm (60\") "   PainSc: 4    PainLoc: Knee       General: A and O x 3, ASA, NAD      Knee Exam List: Knee:  right    ALIGNMENT:     Varus  ,   Patella  tracks  midline    GAIT:    Antalgic    SKIN:    No abnormality    RANGE OF MOTION:   4  -  118   DEG    STRENGTH:   4  / 5    LIGAMENTS:    No varus / valgus instability.   Negative  Lachman.    MENISCUS:     Negative   Nyla       DISTAL PULSES:    Paplable    DISTAL SENSATION :   Intact    LYMPHATICS:     No   lymphadenopathy    OTHER:          - Positive   effusion      - Crepitance with ROM     Radiology:  Xrays 3views right knee (ap,lateral, sunrise) were ordered and reviewed for evaluation of knee pain demonstratingadvanced varus osteoarthritis with bone on bone articulation, subchondral cysts, and periarticular osteophytes.  Today's x-rays were compared with previous films but does show further arthritic progression.    Assessment/Plan: Primary osteoarthritis right knee    Knee Plan List: Continuation of conservative management vs. TKA discussed.  The patient wishes to proceed with total knee replacement.  At this point the patient has failed the full compliment of conservative treatment and stating complete understanding of the risks/benefits/ anternatives wishes to proceed with surgical treatment.    Risk and benefits of surgery were reviewed.  Including, but not limited to, blood clots or pulmonary embolism, anesthesia risk, infection, fracture, skin/leg numbness, persistent pain/crepitance/popping/catching, failure of the implant, need for future surgeries, hematoma, possible nerve or blood vessel injury, need for transfusion, and potential risk of stroke,heart attack or death, among others.  The patient understands and wishes to proceed.     It was explained that if tissue has been repaired or reconstructed, there is also an increased chance of failure which may require further management.  Following the completion of the discussion, the patient expressed  understanding of this planned course of care, all their questions were answered and consent will be obtained preoperatively.    Operative Plan: Smith and Nephmonica Cisnerosinium Total Knee Replacement performing the procedure on an outpatient basis with home health rehab.  Patient does not require any medical clearances before proceeding for surgery.  She has a current BMI of 32.07 kg/m².          Agustín Gtz, EMERITA  4/24/2025

## 2025-04-25 DIAGNOSIS — R73.03 PREDIABETES: ICD-10-CM

## 2025-04-25 DIAGNOSIS — E78.2 MIXED HYPERLIPIDEMIA: ICD-10-CM

## 2025-04-25 DIAGNOSIS — E55.9 VITAMIN D DEFICIENCY: ICD-10-CM

## 2025-04-29 LAB
25(OH)D3+25(OH)D2 SERPL-MCNC: 46.1 NG/ML (ref 30–100)
ALBUMIN SERPL-MCNC: 4.4 G/DL (ref 3.5–5.2)
ALBUMIN/GLOB SERPL: 2 G/DL
ALP SERPL-CCNC: 95 U/L (ref 39–117)
ALT SERPL-CCNC: 25 U/L (ref 1–33)
AST SERPL-CCNC: 26 U/L (ref 1–32)
BASOPHILS # BLD AUTO: 0.05 10*3/MM3 (ref 0–0.2)
BASOPHILS NFR BLD AUTO: 0.8 % (ref 0–1.5)
BILIRUB SERPL-MCNC: 0.5 MG/DL (ref 0–1.2)
BUN SERPL-MCNC: 13 MG/DL (ref 8–23)
BUN/CREAT SERPL: 21.7 (ref 7–25)
CALCIUM SERPL-MCNC: 9.8 MG/DL (ref 8.6–10.5)
CHLORIDE SERPL-SCNC: 102 MMOL/L (ref 98–107)
CHOLEST SERPL-MCNC: 259 MG/DL (ref 0–200)
CO2 SERPL-SCNC: 25.9 MMOL/L (ref 22–29)
CREAT SERPL-MCNC: 0.6 MG/DL (ref 0.57–1)
EGFRCR SERPLBLD CKD-EPI 2021: 96.7 ML/MIN/1.73
EOSINOPHIL # BLD AUTO: 0.15 10*3/MM3 (ref 0–0.4)
EOSINOPHIL NFR BLD AUTO: 2.5 % (ref 0.3–6.2)
ERYTHROCYTE [DISTWIDTH] IN BLOOD BY AUTOMATED COUNT: 14.2 % (ref 12.3–15.4)
GLOBULIN SER CALC-MCNC: 2.2 GM/DL
GLUCOSE SERPL-MCNC: 91 MG/DL (ref 65–99)
HBA1C MFR BLD: 5.9 % (ref 4.8–5.6)
HCT VFR BLD AUTO: 43.6 % (ref 34–46.6)
HDLC SERPL-MCNC: 78 MG/DL (ref 40–60)
HGB BLD-MCNC: 14.2 G/DL (ref 12–15.9)
IMM GRANULOCYTES # BLD AUTO: 0.08 10*3/MM3 (ref 0–0.05)
IMM GRANULOCYTES NFR BLD AUTO: 1.3 % (ref 0–0.5)
LDLC SERPL CALC-MCNC: 161 MG/DL (ref 0–100)
LYMPHOCYTES # BLD AUTO: 1.72 10*3/MM3 (ref 0.7–3.1)
LYMPHOCYTES NFR BLD AUTO: 28.5 % (ref 19.6–45.3)
MCH RBC QN AUTO: 29.2 PG (ref 26.6–33)
MCHC RBC AUTO-ENTMCNC: 32.6 G/DL (ref 31.5–35.7)
MCV RBC AUTO: 89.7 FL (ref 79–97)
MONOCYTES # BLD AUTO: 0.41 10*3/MM3 (ref 0.1–0.9)
MONOCYTES NFR BLD AUTO: 6.8 % (ref 5–12)
NEUTROPHILS # BLD AUTO: 3.63 10*3/MM3 (ref 1.7–7)
NEUTROPHILS NFR BLD AUTO: 60.1 % (ref 42.7–76)
NRBC BLD AUTO-RTO: 0 /100 WBC (ref 0–0.2)
PLATELET # BLD AUTO: 255 10*3/MM3 (ref 140–450)
POTASSIUM SERPL-SCNC: 4.8 MMOL/L (ref 3.5–5.2)
PROT SERPL-MCNC: 6.6 G/DL (ref 6–8.5)
RBC # BLD AUTO: 4.86 10*6/MM3 (ref 3.77–5.28)
SODIUM SERPL-SCNC: 139 MMOL/L (ref 136–145)
TRIGL SERPL-MCNC: 113 MG/DL (ref 0–150)
TSH SERPL DL<=0.005 MIU/L-ACNC: 2.13 UIU/ML (ref 0.27–4.2)
VLDLC SERPL CALC-MCNC: 20 MG/DL (ref 5–40)
WBC # BLD AUTO: 6.04 10*3/MM3 (ref 3.4–10.8)

## 2025-05-09 ENCOUNTER — OFFICE VISIT (OUTPATIENT)
Dept: FAMILY MEDICINE CLINIC | Facility: CLINIC | Age: 70
End: 2025-05-09
Payer: MEDICARE

## 2025-05-09 VITALS
WEIGHT: 165 LBS | HEIGHT: 60 IN | DIASTOLIC BLOOD PRESSURE: 76 MMHG | BODY MASS INDEX: 32.39 KG/M2 | TEMPERATURE: 97.9 F | HEART RATE: 70 BPM | OXYGEN SATURATION: 98 % | SYSTOLIC BLOOD PRESSURE: 128 MMHG

## 2025-05-09 DIAGNOSIS — Z00.00 MEDICARE ANNUAL WELLNESS VISIT, SUBSEQUENT: Primary | ICD-10-CM

## 2025-05-09 DIAGNOSIS — Z12.31 SCREENING MAMMOGRAM FOR BREAST CANCER: ICD-10-CM

## 2025-05-09 NOTE — PROGRESS NOTES
Subjective   The ABCs of the Annual Wellness Visit  Medicare Wellness Visit      Moriah Morris is a 69 y.o. patient who presents for a Medicare Wellness Visit.    The following portions of the patient's history were reviewed and   updated as appropriate: allergies, current medications, past family history, past medical history, past social history, past surgical history, and problem list.    Compared to one year ago, the patient's physical   health is the same.  Compared to one year ago, the patient's mental   health is the same.    Recent Hospitalizations:  She was not admitted to the hospital during the last year.     Current Medical Providers:  Patient Care Team:  Matthew Snowden MD as PCP - General (Emergency Medicine)  Jason Minaya MD as Surgeon (Orthopedic Surgery)  Maureen Faustin PTA as Physical Therapy Assistant (Physical Therapy)  Amanuel Peguero Jr., MD as Consulting Physician (Cardiology)  Evan Zimmerman MD as Surgeon (General Surgery)    Outpatient Medications Prior to Visit   Medication Sig Dispense Refill    celecoxib (CeleBREX) 200 MG capsule Take 1 capsule by mouth 2 (Two) Times a Day. 180 capsule 1    Multiple Vitamins-Minerals (PRESERVISION AREDS 2 PO) Take  by mouth.      vitamin E 200 UNIT capsule Take 1 capsule by mouth Daily.       No facility-administered medications prior to visit.     No opioid medication identified on active medication list. I have reviewed chart for other potential  high risk medication/s and harmful drug interactions in the elderly.      Aspirin is not on active medication list.  Aspirin use is not indicated based on review of current medical condition/s. Risk of harm outweighs potential benefits.  .    Patient Active Problem List   Diagnosis    Knee pain    Pes anserinus bursitis    Arthritis of left knee    Early dry stage nonexudative age-related macular degeneration of both eyes    Bilateral nonexudative age-related macular degeneration    Artificial  "lens present    Primary osteoarthritis of left knee    Primary osteoarthritis of right knee    OA (osteoarthritis) of knee     Advance Care Planning Advance Directive is on file.  ACP discussion was held with the patient during this visit. Patient has an advance directive in EMR which is still valid.             Objective   Vitals:    25 1426   BP: 128/76   BP Location: Left arm   Patient Position: Sitting   Cuff Size: Adult   Pulse: 70   Temp: 97.9 °F (36.6 °C)   SpO2: 98%   Weight: 74.8 kg (165 lb)   Height: 152.4 cm (60\")   PainSc: 0-No pain       Estimated body mass index is 32.22 kg/m² as calculated from the following:    Height as of this encounter: 152.4 cm (60\").    Weight as of this encounter: 74.8 kg (165 lb).                Does the patient have evidence of cognitive impairment? No  Lab Results   Component Value Date    CHLPL 259 (H) 2025    TRIG 113 2025    HDL 78 (H) 2025     (H) 2025    VLDL 20 2025    HGBA1C 5.90 (H) 2025                                                                                               Health  Risk Assessment    Smoking Status:  Social History     Tobacco Use   Smoking Status Never    Passive exposure: Never   Smokeless Tobacco Never     Alcohol Consumption:  Social History     Substance and Sexual Activity   Alcohol Use Yes    Alcohol/week: 1.0 standard drink of alcohol    Types: 1 Cans of beer per week    Comment: Used to drink occasionally- maybe one or 2 drinks per month       Fall Risk Screen  STEADI Fall Risk Assessment was completed, and patient is at LOW risk for falls.Assessment completed on:2025    Depression Screening   Little interest or pleasure in doing things? Not at all   Feeling down, depressed, or hopeless? Not at all   PHQ-2 Total Score 0      Health Habits and Functional and Cognitive Screenin/2/2025     7:49 AM   Functional & Cognitive Status   Do you have difficulty preparing food and " eating? No   Do you have difficulty bathing yourself, getting dressed or grooming yourself? No   Do you have difficulty using the toilet? No   Do you have difficulty moving around from place to place? No   Do you have trouble with steps or getting out of a bed or a chair? No   Current Diet Other   Dental Exam Up to date   Eye Exam Up to date   Exercise (times per week) 6 times per week   Current Exercises Include Bicycling Outdoors;Stationary Bicycling/Spin Class;Swim Aerobics Class   Do you need help using the phone?  No   Are you deaf or do you have serious difficulty hearing?  No   Do you need help to go to places out of walking distance? No   Do you need help shopping? No   Do you need help preparing meals?  No   Do you need help with housework?  No   Do you need help with laundry? No   Do you need help taking your medications? No   Do you need help managing money? No   Do you ever drive or ride in a car without wearing a seat belt? No   Have you felt unusual stress, anger or loneliness in the last month? No   Who do you live with? Sibling   If you need help, do you have trouble finding someone available to you? No   Have you been bothered in the last four weeks by sexual problems? No   Do you have difficulty concentrating, remembering or making decisions? No           Age-appropriate Screening Schedule:  Refer to the list below for future screening recommendations based on patient's age, sex and/or medical conditions. Orders for these recommended tests are listed in the plan section. The patient has been provided with a written plan.    Health Maintenance List  Health Maintenance   Topic Date Due    ZOSTER VACCINE (1 of 2) Never done    TDAP/TD VACCINES (2 - Td or Tdap) 05/12/2024    COVID-19 Vaccine (4 - 2024-25 season) 09/01/2024    MAMMOGRAM  12/16/2024    DXA SCAN  12/16/2024    ANNUAL WELLNESS VISIT  03/18/2025    INFLUENZA VACCINE  07/01/2025    COLORECTAL CANCER SCREENING  12/14/2025    LIPID PANEL   04/28/2026    PAP SMEAR  03/14/2028    HEPATITIS C SCREENING  Completed    Pneumococcal Vaccine 50+  Completed                                                                                                                                                CMS Preventative Services Quick Reference  Risk Factors Identified During Encounter  None Identified    The above risks/problems have been discussed with the patient.  Pertinent information has been shared with the patient in the After Visit Summary.  An After Visit Summary and PPPS were made available to the patient.    Follow Up:   Next Medicare Wellness visit to be scheduled in 1 year.     Assessment & Plan  Medicare annual wellness visit, subsequent  Reviewed health maintenance.  Patient is going to work on improving her diet.  Patient is scheduled to have right knee replacement on September 26, 2025.       Screening mammogram for breast cancer  Will obtain screening mammogram.  Orders:    Mammo Screening Digital Tomosynthesis Bilateral With CAD; Future         Follow Up:   Return in about 6 months (around 11/9/2025).

## 2025-05-23 ENCOUNTER — HOSPITAL ENCOUNTER (OUTPATIENT)
Dept: MAMMOGRAPHY | Facility: HOSPITAL | Age: 70
Discharge: HOME | End: 2025-05-23
Admitting: FAMILY MEDICINE
Payer: MEDICARE

## 2025-05-23 DIAGNOSIS — Z12.31 SCREENING MAMMOGRAM FOR BREAST CANCER: ICD-10-CM

## 2025-05-23 PROCEDURE — 77067 SCR MAMMO BI INCL CAD: CPT

## 2025-05-23 PROCEDURE — 77063 BREAST TOMOSYNTHESIS BI: CPT

## 2025-07-24 ENCOUNTER — TELEPHONE (OUTPATIENT)
Dept: ORTHOPEDIC SURGERY | Facility: CLINIC | Age: 70
End: 2025-07-24

## 2025-08-12 ENCOUNTER — PRE-ADMISSION TESTING (OUTPATIENT)
Dept: PREADMISSION TESTING | Facility: HOSPITAL | Age: 70
End: 2025-08-12
Payer: MEDICARE

## 2025-08-12 VITALS
TEMPERATURE: 98 F | HEIGHT: 60 IN | BODY MASS INDEX: 33.34 KG/M2 | WEIGHT: 169.8 LBS | RESPIRATION RATE: 16 BRPM | OXYGEN SATURATION: 99 % | SYSTOLIC BLOOD PRESSURE: 163 MMHG | DIASTOLIC BLOOD PRESSURE: 87 MMHG | HEART RATE: 60 BPM

## 2025-08-12 DIAGNOSIS — M17.11 PRIMARY OSTEOARTHRITIS OF RIGHT KNEE: ICD-10-CM

## 2025-08-12 LAB
ANION GAP SERPL CALCULATED.3IONS-SCNC: 7.7 MMOL/L (ref 5–15)
BUN SERPL-MCNC: 17 MG/DL (ref 8–23)
BUN/CREAT SERPL: 27 (ref 7–25)
CALCIUM SPEC-SCNC: 9.7 MG/DL (ref 8.6–10.5)
CHLORIDE SERPL-SCNC: 105 MMOL/L (ref 98–107)
CO2 SERPL-SCNC: 25.3 MMOL/L (ref 22–29)
CREAT SERPL-MCNC: 0.63 MG/DL (ref 0.57–1)
DEPRECATED RDW RBC AUTO: 43 FL (ref 37–54)
EGFRCR SERPLBLD CKD-EPI 2021: 95 ML/MIN/1.73
ERYTHROCYTE [DISTWIDTH] IN BLOOD BY AUTOMATED COUNT: 13.3 % (ref 12.3–15.4)
GLUCOSE SERPL-MCNC: 98 MG/DL (ref 65–99)
HBA1C MFR BLD: 5.7 % (ref 4.8–5.6)
HCT VFR BLD AUTO: 39.7 % (ref 34–46.6)
HGB BLD-MCNC: 13.9 G/DL (ref 12–15.9)
MCH RBC QN AUTO: 31.3 PG (ref 26.6–33)
MCHC RBC AUTO-ENTMCNC: 35 G/DL (ref 31.5–35.7)
MCV RBC AUTO: 89.4 FL (ref 79–97)
PLATELET # BLD AUTO: 243 10*3/MM3 (ref 140–450)
PMV BLD AUTO: 10.4 FL (ref 6–12)
POTASSIUM SERPL-SCNC: 4.6 MMOL/L (ref 3.5–5.2)
QT INTERVAL: 423 MS
QTC INTERVAL: 400 MS
RBC # BLD AUTO: 4.44 10*6/MM3 (ref 3.77–5.28)
SODIUM SERPL-SCNC: 138 MMOL/L (ref 136–145)
WBC NRBC COR # BLD AUTO: 6.67 10*3/MM3 (ref 3.4–10.8)

## 2025-08-12 PROCEDURE — 80048 BASIC METABOLIC PNL TOTAL CA: CPT

## 2025-08-12 PROCEDURE — 83036 HEMOGLOBIN GLYCOSYLATED A1C: CPT

## 2025-08-12 PROCEDURE — 85027 COMPLETE CBC AUTOMATED: CPT

## 2025-08-12 PROCEDURE — 93005 ELECTROCARDIOGRAM TRACING: CPT

## 2025-08-12 PROCEDURE — 36415 COLL VENOUS BLD VENIPUNCTURE: CPT

## 2025-08-12 RX ORDER — MULTIVIT-MIN/IRON/FOLIC ACID/K 18-600-40
2000 CAPSULE ORAL EVERY MORNING
COMMUNITY

## 2025-08-20 ENCOUNTER — TELEPHONE (OUTPATIENT)
Dept: ORTHOPEDIC SURGERY | Facility: HOSPITAL | Age: 70
End: 2025-08-20
Payer: MEDICARE

## 2025-08-21 ENCOUNTER — OFFICE VISIT (OUTPATIENT)
Dept: ORTHOPEDIC SURGERY | Facility: CLINIC | Age: 70
End: 2025-08-21
Payer: MEDICARE

## 2025-08-21 VITALS
TEMPERATURE: 97.6 F | SYSTOLIC BLOOD PRESSURE: 135 MMHG | WEIGHT: 170 LBS | OXYGEN SATURATION: 96 % | DIASTOLIC BLOOD PRESSURE: 77 MMHG | BODY MASS INDEX: 33.38 KG/M2 | HEIGHT: 60 IN | HEART RATE: 84 BPM

## 2025-08-21 DIAGNOSIS — M17.11 PRIMARY OSTEOARTHRITIS OF RIGHT KNEE: Primary | ICD-10-CM

## 2025-08-25 ENCOUNTER — TELEPHONE (OUTPATIENT)
Dept: ORTHOPEDIC SURGERY | Facility: CLINIC | Age: 70
End: 2025-08-25
Payer: MEDICARE

## 2025-08-26 ENCOUNTER — ANESTHESIA EVENT (OUTPATIENT)
Dept: PERIOP | Facility: HOSPITAL | Age: 70
End: 2025-08-26
Payer: MEDICARE

## 2025-08-27 ENCOUNTER — APPOINTMENT (OUTPATIENT)
Dept: GENERAL RADIOLOGY | Facility: HOSPITAL | Age: 70
End: 2025-08-27
Payer: MEDICARE

## 2025-08-27 ENCOUNTER — HOSPITAL ENCOUNTER (OUTPATIENT)
Facility: HOSPITAL | Age: 70
Setting detail: HOSPITAL OUTPATIENT SURGERY
Discharge: HOME HEALTH | End: 2025-08-27
Attending: ORTHOPAEDIC SURGERY | Admitting: ORTHOPAEDIC SURGERY
Payer: MEDICARE

## 2025-08-27 ENCOUNTER — ANESTHESIA (OUTPATIENT)
Dept: PERIOP | Facility: HOSPITAL | Age: 70
End: 2025-08-27
Payer: MEDICARE

## 2025-08-27 VITALS
SYSTOLIC BLOOD PRESSURE: 104 MMHG | TEMPERATURE: 97.5 F | OXYGEN SATURATION: 99 % | RESPIRATION RATE: 16 BRPM | DIASTOLIC BLOOD PRESSURE: 75 MMHG | HEART RATE: 63 BPM

## 2025-08-27 DIAGNOSIS — Z96.651 S/P TKR (TOTAL KNEE REPLACEMENT), RIGHT: Primary | ICD-10-CM

## 2025-08-27 DIAGNOSIS — M17.11 PRIMARY OSTEOARTHRITIS OF RIGHT KNEE: ICD-10-CM

## 2025-08-27 PROCEDURE — 25810000003 LACTATED RINGERS SOLUTION: Performed by: NURSE PRACTITIONER

## 2025-08-27 PROCEDURE — 25010000002 LIDOCAINE PF 2% 2 % SOLUTION: Performed by: NURSE ANESTHETIST, CERTIFIED REGISTERED

## 2025-08-27 PROCEDURE — 27447 TOTAL KNEE ARTHROPLASTY: CPT | Performed by: NURSE PRACTITIONER

## 2025-08-27 PROCEDURE — 25810000003 LACTATED RINGERS PER 1000 ML: Performed by: ANESTHESIOLOGY

## 2025-08-27 PROCEDURE — 25010000002 PROPOFOL 10 MG/ML EMULSION: Performed by: NURSE ANESTHETIST, CERTIFIED REGISTERED

## 2025-08-27 PROCEDURE — 25010000002 ROPIVACAINE PER 1 MG: Performed by: ANESTHESIOLOGY

## 2025-08-27 PROCEDURE — C1776 JOINT DEVICE (IMPLANTABLE): HCPCS | Performed by: ORTHOPAEDIC SURGERY

## 2025-08-27 PROCEDURE — 73560 X-RAY EXAM OF KNEE 1 OR 2: CPT

## 2025-08-27 PROCEDURE — 25010000002 ONDANSETRON PER 1 MG: Performed by: NURSE ANESTHETIST, CERTIFIED REGISTERED

## 2025-08-27 PROCEDURE — 25010000002 KETOROLAC TROMETHAMINE PER 15 MG: Performed by: ORTHOPAEDIC SURGERY

## 2025-08-27 PROCEDURE — 25010000002 EPINEPHRINE 1 MG/ML SOLUTION 30 ML VIAL: Performed by: ORTHOPAEDIC SURGERY

## 2025-08-27 PROCEDURE — 27447 TOTAL KNEE ARTHROPLASTY: CPT | Performed by: ORTHOPAEDIC SURGERY

## 2025-08-27 PROCEDURE — 97110 THERAPEUTIC EXERCISES: CPT

## 2025-08-27 PROCEDURE — 25010000002 CEFAZOLIN PER 500 MG: Performed by: NURSE PRACTITIONER

## 2025-08-27 PROCEDURE — 25010000002 DROPERIDOL PER 5 MG: Performed by: NURSE ANESTHETIST, CERTIFIED REGISTERED

## 2025-08-27 PROCEDURE — 25010000002 DEXAMETHASONE SODIUM PHOSPHATE 20 MG/5ML SOLUTION: Performed by: NURSE ANESTHETIST, CERTIFIED REGISTERED

## 2025-08-27 PROCEDURE — C1713 ANCHOR/SCREW BN/BN,TIS/BN: HCPCS | Performed by: ORTHOPAEDIC SURGERY

## 2025-08-27 PROCEDURE — 25010000002 MORPHINE PER 10 MG: Performed by: ORTHOPAEDIC SURGERY

## 2025-08-27 PROCEDURE — 25810000003 SODIUM CHLORIDE 0.9 % SOLUTION 250 ML FLEX CONT: Performed by: NURSE PRACTITIONER

## 2025-08-27 PROCEDURE — 25010000002 VANCOMYCIN HCL 1.25 G RECONSTITUTED SOLUTION 1 EACH VIAL: Performed by: NURSE PRACTITIONER

## 2025-08-27 PROCEDURE — 25010000002 HYDROMORPHONE PER 4 MG: Performed by: NURSE ANESTHETIST, CERTIFIED REGISTERED

## 2025-08-27 PROCEDURE — 25010000002 ROPIVACAINE PER 1 MG: Performed by: ORTHOPAEDIC SURGERY

## 2025-08-27 PROCEDURE — 25010000002 FENTANYL CITRATE (PF) 100 MCG/2ML SOLUTION: Performed by: NURSE ANESTHETIST, CERTIFIED REGISTERED

## 2025-08-27 PROCEDURE — 25010000002 SUGAMMADEX 200 MG/2ML SOLUTION: Performed by: NURSE ANESTHETIST, CERTIFIED REGISTERED

## 2025-08-27 PROCEDURE — 25010000002 MIDAZOLAM PER 1 MG: Performed by: ANESTHESIOLOGY

## 2025-08-27 PROCEDURE — 25010000002 ACETAMINOPHEN 10 MG/ML SOLUTION: Performed by: NURSE ANESTHETIST, CERTIFIED REGISTERED

## 2025-08-27 PROCEDURE — 25010000002 PROPOFOL 200 MG/20ML EMULSION: Performed by: NURSE ANESTHETIST, CERTIFIED REGISTERED

## 2025-08-27 PROCEDURE — 25010000002 FENTANYL CITRATE (PF) 50 MCG/ML SOLUTION: Performed by: ANESTHESIOLOGY

## 2025-08-27 PROCEDURE — 25010000002 FAMOTIDINE 10 MG/ML SOLUTION: Performed by: ANESTHESIOLOGY

## 2025-08-27 PROCEDURE — 97161 PT EVAL LOW COMPLEX 20 MIN: CPT

## 2025-08-27 DEVICE — LEGION CRUCIATE RETAINING OXINIUM                                    FEMORAL SIZE 3 RIGHT
Type: IMPLANTABLE DEVICE | Site: KNEE | Status: FUNCTIONAL
Brand: LEGION

## 2025-08-27 DEVICE — LEGION CRUCIATE RETAINING HIGH                                    FLEX HIGHLY CROSS LINKED                                    POLYETHYLENE SIZE 3-4 11MM
Type: IMPLANTABLE DEVICE | Site: KNEE | Status: FUNCTIONAL
Brand: LEGION

## 2025-08-27 DEVICE — PALACOS® R IS A FAST-CURING, RADIOPAQUE, POLY(METHYL METHACRYLATE)-BASED BONE CEMENT.PALACOS ® R CONTAINS THE X-RAY CONTRAST MEDIUM ZIRCONIUM DIOXIDE. TO IMPROVE VISIBILITY IN THE SURGICAL FIELD PALACOS ® R HAS BEEN COLOURED WITH CHLOROPHYLL (E141). THE BONE CEMENT IS PREPARED DIRECTLY BEFORE USE BY MIXING A POLYMER POWDER COMPONENT WITH A LIQUID MONOMER COMPONENT. A DUCTILE DOUGH FORMS WHICH CURES WITHIN A FEW MINUTES.
Type: IMPLANTABLE DEVICE | Site: KNEE | Status: FUNCTIONAL
Brand: PALACOS®

## 2025-08-27 DEVICE — IMPLANTABLE DEVICE: Type: IMPLANTABLE DEVICE | Status: FUNCTIONAL

## 2025-08-27 DEVICE — VIOLET ANTIBACTERIAL POLYDIOXANONE, KNOTLESS TISSUE CONTROL DEVICE
Type: IMPLANTABLE DEVICE | Site: KNEE | Status: FUNCTIONAL
Brand: STRATAFIX

## 2025-08-27 DEVICE — GENESIS II NON-POROUS TIBIAL                                    BASEPLATE SIZE 3 RIGHT
Type: IMPLANTABLE DEVICE | Site: KNEE | Status: FUNCTIONAL
Brand: GENESIS II

## 2025-08-27 DEVICE — GENESIS II BICONVEX PATELLA 26MM
Type: IMPLANTABLE DEVICE | Site: KNEE | Status: FUNCTIONAL
Brand: GENESIS II

## 2025-08-27 DEVICE — KNOTLESS TISSUE CONTROL DEVICE, UNDYED UNIDIRECTIONAL (ANTIBACTERIAL) SYNTHETIC ABSORBABLE DEVICE
Type: IMPLANTABLE DEVICE | Site: KNEE | Status: FUNCTIONAL
Brand: STRATAFIX

## 2025-08-27 RX ORDER — MELOXICAM 15 MG/1
15 TABLET ORAL ONCE
Status: COMPLETED | OUTPATIENT
Start: 2025-08-27 | End: 2025-08-27

## 2025-08-27 RX ORDER — OXYCODONE AND ACETAMINOPHEN 7.5; 325 MG/1; MG/1
1 TABLET ORAL EVERY 4 HOURS PRN
Status: DISCONTINUED | OUTPATIENT
Start: 2025-08-27 | End: 2025-08-27 | Stop reason: HOSPADM

## 2025-08-27 RX ORDER — EPHEDRINE SULFATE 50 MG/ML
INJECTION INTRAVENOUS AS NEEDED
Status: DISCONTINUED | OUTPATIENT
Start: 2025-08-27 | End: 2025-08-27 | Stop reason: SURG

## 2025-08-27 RX ORDER — IPRATROPIUM BROMIDE AND ALBUTEROL SULFATE 2.5; .5 MG/3ML; MG/3ML
3 SOLUTION RESPIRATORY (INHALATION) ONCE AS NEEDED
Status: DISCONTINUED | OUTPATIENT
Start: 2025-08-27 | End: 2025-08-27 | Stop reason: HOSPADM

## 2025-08-27 RX ORDER — MAGNESIUM HYDROXIDE 1200 MG/15ML
LIQUID ORAL AS NEEDED
Status: DISCONTINUED | OUTPATIENT
Start: 2025-08-27 | End: 2025-08-27 | Stop reason: HOSPADM

## 2025-08-27 RX ORDER — SODIUM CHLORIDE 0.9 % (FLUSH) 0.9 %
3-10 SYRINGE (ML) INJECTION AS NEEDED
Status: DISCONTINUED | OUTPATIENT
Start: 2025-08-27 | End: 2025-08-27 | Stop reason: HOSPADM

## 2025-08-27 RX ORDER — SODIUM CHLORIDE, SODIUM LACTATE, POTASSIUM CHLORIDE, CALCIUM CHLORIDE 600; 310; 30; 20 MG/100ML; MG/100ML; MG/100ML; MG/100ML
9 INJECTION, SOLUTION INTRAVENOUS CONTINUOUS
Status: DISCONTINUED | OUTPATIENT
Start: 2025-08-27 | End: 2025-08-27 | Stop reason: HOSPADM

## 2025-08-27 RX ORDER — ONDANSETRON 2 MG/ML
4 INJECTION INTRAMUSCULAR; INTRAVENOUS ONCE AS NEEDED
Status: DISCONTINUED | OUTPATIENT
Start: 2025-08-27 | End: 2025-08-27 | Stop reason: HOSPADM

## 2025-08-27 RX ORDER — PANTOPRAZOLE SODIUM 40 MG/1
40 TABLET, DELAYED RELEASE ORAL DAILY
Qty: 14 TABLET | Refills: 0 | Status: SHIPPED | OUTPATIENT
Start: 2025-08-27 | End: 2025-09-10

## 2025-08-27 RX ORDER — KETAMINE HCL IN NACL, ISO-OSM 100MG/10ML
SYRINGE (ML) INJECTION AS NEEDED
Status: DISCONTINUED | OUTPATIENT
Start: 2025-08-27 | End: 2025-08-27 | Stop reason: SURG

## 2025-08-27 RX ORDER — DROPERIDOL 2.5 MG/ML
INJECTION, SOLUTION INTRAMUSCULAR; INTRAVENOUS AS NEEDED
Status: DISCONTINUED | OUTPATIENT
Start: 2025-08-27 | End: 2025-08-27 | Stop reason: SURG

## 2025-08-27 RX ORDER — HYDROMORPHONE HYDROCHLORIDE 1 MG/ML
0.5 INJECTION, SOLUTION INTRAMUSCULAR; INTRAVENOUS; SUBCUTANEOUS
Status: DISCONTINUED | OUTPATIENT
Start: 2025-08-27 | End: 2025-08-27 | Stop reason: HOSPADM

## 2025-08-27 RX ORDER — TRANEXAMIC ACID 100 MG/ML
INJECTION, SOLUTION INTRAVENOUS AS NEEDED
Status: DISCONTINUED | OUTPATIENT
Start: 2025-08-27 | End: 2025-08-27 | Stop reason: SURG

## 2025-08-27 RX ORDER — ACETAMINOPHEN 325 MG/1
650 TABLET ORAL EVERY 6 HOURS PRN
Status: CANCELLED | OUTPATIENT
Start: 2025-08-27 | End: 2025-08-30

## 2025-08-27 RX ORDER — LIDOCAINE HYDROCHLORIDE 20 MG/ML
INJECTION, SOLUTION EPIDURAL; INFILTRATION; INTRACAUDAL; PERINEURAL AS NEEDED
Status: DISCONTINUED | OUTPATIENT
Start: 2025-08-27 | End: 2025-08-27 | Stop reason: SURG

## 2025-08-27 RX ORDER — HYDROCODONE BITARTRATE AND ACETAMINOPHEN 7.5; 325 MG/1; MG/1
1 TABLET ORAL EVERY 4 HOURS PRN
Refills: 0 | Status: CANCELLED | OUTPATIENT
Start: 2025-08-27 | End: 2025-09-03

## 2025-08-27 RX ORDER — PROMETHAZINE HYDROCHLORIDE 25 MG/1
12.5 TABLET ORAL EVERY 4 HOURS PRN
Status: CANCELLED | OUTPATIENT
Start: 2025-08-27

## 2025-08-27 RX ORDER — FENTANYL CITRATE 50 UG/ML
50 INJECTION, SOLUTION INTRAMUSCULAR; INTRAVENOUS ONCE AS NEEDED
Status: COMPLETED | OUTPATIENT
Start: 2025-08-27 | End: 2025-08-27

## 2025-08-27 RX ORDER — LABETALOL HYDROCHLORIDE 5 MG/ML
5 INJECTION, SOLUTION INTRAVENOUS
Status: DISCONTINUED | OUTPATIENT
Start: 2025-08-27 | End: 2025-08-27 | Stop reason: HOSPADM

## 2025-08-27 RX ORDER — DIPHENHYDRAMINE HYDROCHLORIDE 50 MG/ML
12.5 INJECTION, SOLUTION INTRAMUSCULAR; INTRAVENOUS
Status: DISCONTINUED | OUTPATIENT
Start: 2025-08-27 | End: 2025-08-27 | Stop reason: HOSPADM

## 2025-08-27 RX ORDER — HYDROCODONE BITARTRATE AND ACETAMINOPHEN 7.5; 325 MG/1; MG/1
1 TABLET ORAL EVERY 4 HOURS PRN
Qty: 40 TABLET | Refills: 0 | Status: SHIPPED | OUTPATIENT
Start: 2025-08-27

## 2025-08-27 RX ORDER — PROMETHAZINE HYDROCHLORIDE 25 MG/1
25 TABLET ORAL ONCE AS NEEDED
Status: DISCONTINUED | OUTPATIENT
Start: 2025-08-27 | End: 2025-08-27 | Stop reason: HOSPADM

## 2025-08-27 RX ORDER — ASPIRIN 81 MG/1
81 TABLET ORAL EVERY 12 HOURS SCHEDULED
Status: CANCELLED | OUTPATIENT
Start: 2025-08-28

## 2025-08-27 RX ORDER — DEXAMETHASONE SODIUM PHOSPHATE 4 MG/ML
INJECTION, SOLUTION INTRA-ARTICULAR; INTRALESIONAL; INTRAMUSCULAR; INTRAVENOUS; SOFT TISSUE AS NEEDED
Status: DISCONTINUED | OUTPATIENT
Start: 2025-08-27 | End: 2025-08-27 | Stop reason: SURG

## 2025-08-27 RX ORDER — HYDROCODONE BITARTRATE AND ACETAMINOPHEN 7.5; 325 MG/1; MG/1
2 TABLET ORAL EVERY 4 HOURS PRN
Refills: 0 | Status: CANCELLED | OUTPATIENT
Start: 2025-08-27 | End: 2025-09-03

## 2025-08-27 RX ORDER — FENTANYL CITRATE 50 UG/ML
INJECTION, SOLUTION INTRAMUSCULAR; INTRAVENOUS AS NEEDED
Status: DISCONTINUED | OUTPATIENT
Start: 2025-08-27 | End: 2025-08-27 | Stop reason: SURG

## 2025-08-27 RX ORDER — MIDAZOLAM HYDROCHLORIDE 1 MG/ML
0.5 INJECTION, SOLUTION INTRAMUSCULAR; INTRAVENOUS
Status: DISCONTINUED | OUTPATIENT
Start: 2025-08-27 | End: 2025-08-27 | Stop reason: HOSPADM

## 2025-08-27 RX ORDER — HYDRALAZINE HYDROCHLORIDE 20 MG/ML
5 INJECTION INTRAMUSCULAR; INTRAVENOUS
Status: DISCONTINUED | OUTPATIENT
Start: 2025-08-27 | End: 2025-08-27 | Stop reason: HOSPADM

## 2025-08-27 RX ORDER — ASPIRIN 81 MG/1
TABLET ORAL
Qty: 60 TABLET | Refills: 0 | Status: SHIPPED | OUTPATIENT
Start: 2025-08-27 | End: 2025-10-12

## 2025-08-27 RX ORDER — ATROPINE SULFATE 0.4 MG/ML
0.4 INJECTION, SOLUTION INTRAMUSCULAR; INTRAVENOUS; SUBCUTANEOUS ONCE AS NEEDED
Status: DISCONTINUED | OUTPATIENT
Start: 2025-08-27 | End: 2025-08-27 | Stop reason: HOSPADM

## 2025-08-27 RX ORDER — MELOXICAM 7.5 MG/1
15 TABLET ORAL ONCE
Status: DISCONTINUED | OUTPATIENT
Start: 2025-08-27 | End: 2025-08-27

## 2025-08-27 RX ORDER — MELOXICAM 15 MG/1
15 TABLET ORAL DAILY
Qty: 14 TABLET | Refills: 0 | Status: SHIPPED | OUTPATIENT
Start: 2025-08-27

## 2025-08-27 RX ORDER — FAMOTIDINE 10 MG/ML
20 INJECTION, SOLUTION INTRAVENOUS ONCE
Status: COMPLETED | OUTPATIENT
Start: 2025-08-27 | End: 2025-08-27

## 2025-08-27 RX ORDER — ONDANSETRON 2 MG/ML
INJECTION INTRAMUSCULAR; INTRAVENOUS AS NEEDED
Status: DISCONTINUED | OUTPATIENT
Start: 2025-08-27 | End: 2025-08-27 | Stop reason: SURG

## 2025-08-27 RX ORDER — LIDOCAINE HYDROCHLORIDE 10 MG/ML
0.5 INJECTION, SOLUTION INFILTRATION; PERINEURAL ONCE AS NEEDED
Status: DISCONTINUED | OUTPATIENT
Start: 2025-08-27 | End: 2025-08-27 | Stop reason: HOSPADM

## 2025-08-27 RX ORDER — FLUMAZENIL 0.1 MG/ML
0.2 INJECTION INTRAVENOUS AS NEEDED
Status: DISCONTINUED | OUTPATIENT
Start: 2025-08-27 | End: 2025-08-27 | Stop reason: HOSPADM

## 2025-08-27 RX ORDER — POLYETHYLENE GLYCOL 3350 17 G/17G
17 POWDER, FOR SOLUTION ORAL 2 TIMES DAILY
Qty: 238 G | Refills: 0 | Status: SHIPPED | OUTPATIENT
Start: 2025-08-27 | End: 2025-09-03

## 2025-08-27 RX ORDER — EPHEDRINE SULFATE 50 MG/ML
5 INJECTION, SOLUTION INTRAVENOUS ONCE AS NEEDED
Status: DISCONTINUED | OUTPATIENT
Start: 2025-08-27 | End: 2025-08-27 | Stop reason: HOSPADM

## 2025-08-27 RX ORDER — PROPOFOL 10 MG/ML
INJECTION, EMULSION INTRAVENOUS AS NEEDED
Status: DISCONTINUED | OUTPATIENT
Start: 2025-08-27 | End: 2025-08-27 | Stop reason: SURG

## 2025-08-27 RX ORDER — FENTANYL CITRATE 50 UG/ML
50 INJECTION, SOLUTION INTRAMUSCULAR; INTRAVENOUS
Status: DISCONTINUED | OUTPATIENT
Start: 2025-08-27 | End: 2025-08-27 | Stop reason: HOSPADM

## 2025-08-27 RX ORDER — PREGABALIN 75 MG/1
150 CAPSULE ORAL ONCE
Status: COMPLETED | OUTPATIENT
Start: 2025-08-27 | End: 2025-08-27

## 2025-08-27 RX ORDER — ONDANSETRON 2 MG/ML
4 INJECTION INTRAMUSCULAR; INTRAVENOUS ONCE AS NEEDED
Status: CANCELLED | OUTPATIENT
Start: 2025-08-27

## 2025-08-27 RX ORDER — HYDROCODONE BITARTRATE AND ACETAMINOPHEN 5; 325 MG/1; MG/1
1 TABLET ORAL ONCE AS NEEDED
Status: DISCONTINUED | OUTPATIENT
Start: 2025-08-27 | End: 2025-08-27 | Stop reason: HOSPADM

## 2025-08-27 RX ORDER — FAMOTIDINE 10 MG/ML
20 INJECTION, SOLUTION INTRAVENOUS ONCE
Status: DISCONTINUED | OUTPATIENT
Start: 2025-08-27 | End: 2025-08-27 | Stop reason: HOSPADM

## 2025-08-27 RX ORDER — DROPERIDOL 2.5 MG/ML
0.62 INJECTION, SOLUTION INTRAMUSCULAR; INTRAVENOUS
Status: DISCONTINUED | OUTPATIENT
Start: 2025-08-27 | End: 2025-08-27 | Stop reason: HOSPADM

## 2025-08-27 RX ORDER — ACETAMINOPHEN 10 MG/ML
INJECTION, SOLUTION INTRAVENOUS AS NEEDED
Status: DISCONTINUED | OUTPATIENT
Start: 2025-08-27 | End: 2025-08-27 | Stop reason: SURG

## 2025-08-27 RX ORDER — SODIUM CHLORIDE 0.9 % (FLUSH) 0.9 %
3 SYRINGE (ML) INJECTION EVERY 12 HOURS SCHEDULED
Status: DISCONTINUED | OUTPATIENT
Start: 2025-08-27 | End: 2025-08-27 | Stop reason: HOSPADM

## 2025-08-27 RX ORDER — ROCURONIUM BROMIDE 10 MG/ML
INJECTION, SOLUTION INTRAVENOUS AS NEEDED
Status: DISCONTINUED | OUTPATIENT
Start: 2025-08-27 | End: 2025-08-27 | Stop reason: SURG

## 2025-08-27 RX ORDER — NALOXONE HCL 0.4 MG/ML
0.2 VIAL (ML) INJECTION AS NEEDED
Status: DISCONTINUED | OUTPATIENT
Start: 2025-08-27 | End: 2025-08-27 | Stop reason: HOSPADM

## 2025-08-27 RX ORDER — PROMETHAZINE HYDROCHLORIDE 25 MG/1
25 SUPPOSITORY RECTAL ONCE AS NEEDED
Status: DISCONTINUED | OUTPATIENT
Start: 2025-08-27 | End: 2025-08-27 | Stop reason: HOSPADM

## 2025-08-27 RX ORDER — ROPIVACAINE HYDROCHLORIDE 5 MG/ML
INJECTION, SOLUTION EPIDURAL; INFILTRATION; PERINEURAL
Status: COMPLETED | OUTPATIENT
Start: 2025-08-27 | End: 2025-08-27

## 2025-08-27 RX ORDER — ONDANSETRON 4 MG/1
4 TABLET, FILM COATED ORAL EVERY 8 HOURS PRN
Qty: 10 TABLET | Refills: 0 | Status: SHIPPED | OUTPATIENT
Start: 2025-08-27

## 2025-08-27 RX ORDER — ONDANSETRON 4 MG/1
4 TABLET, ORALLY DISINTEGRATING ORAL EVERY 6 HOURS PRN
Status: CANCELLED | OUTPATIENT
Start: 2025-08-27

## 2025-08-27 RX ORDER — FENTANYL CITRATE 50 UG/ML
50 INJECTION, SOLUTION INTRAMUSCULAR; INTRAVENOUS ONCE AS NEEDED
Status: DISCONTINUED | OUTPATIENT
Start: 2025-08-27 | End: 2025-08-27 | Stop reason: HOSPADM

## 2025-08-27 RX ADMIN — FAMOTIDINE 20 MG: 10 INJECTION INTRAVENOUS at 06:01

## 2025-08-27 RX ADMIN — MIDAZOLAM 1 MG: 1 INJECTION INTRAMUSCULAR; INTRAVENOUS at 06:18

## 2025-08-27 RX ADMIN — PROPOFOL 25 MCG/KG/MIN: 10 INJECTION, EMULSION INTRAVENOUS at 07:11

## 2025-08-27 RX ADMIN — TRANEXAMIC ACID 1000 MG: 100 INJECTION INTRAVENOUS at 07:59

## 2025-08-27 RX ADMIN — LIDOCAINE HYDROCHLORIDE 100 MG: 20 INJECTION, SOLUTION EPIDURAL; INFILTRATION; INTRACAUDAL; PERINEURAL at 07:09

## 2025-08-27 RX ADMIN — CEFAZOLIN 2 G: 2 INJECTION, POWDER, FOR SOLUTION INTRAMUSCULAR; INTRAVENOUS at 06:52

## 2025-08-27 RX ADMIN — FENTANYL CITRATE 50 MCG: 50 INJECTION, SOLUTION INTRAMUSCULAR; INTRAVENOUS at 07:26

## 2025-08-27 RX ADMIN — SUGAMMADEX 20 MG: 100 INJECTION, SOLUTION INTRAVENOUS at 08:06

## 2025-08-27 RX ADMIN — SODIUM CHLORIDE, POTASSIUM CHLORIDE, SODIUM LACTATE AND CALCIUM CHLORIDE: 600; 310; 30; 20 INJECTION, SOLUTION INTRAVENOUS at 06:05

## 2025-08-27 RX ADMIN — PROPOFOL 50 MG: 10 INJECTION, EMULSION INTRAVENOUS at 07:41

## 2025-08-27 RX ADMIN — FENTANYL CITRATE 50 MCG: 50 INJECTION, SOLUTION INTRAMUSCULAR; INTRAVENOUS at 07:09

## 2025-08-27 RX ADMIN — ROCURONIUM BROMIDE 40 MG: 10 INJECTION, SOLUTION INTRAVENOUS at 07:09

## 2025-08-27 RX ADMIN — HYDROMORPHONE HYDROCHLORIDE 0.5 MG: 1 INJECTION, SOLUTION INTRAMUSCULAR; INTRAVENOUS; SUBCUTANEOUS at 08:43

## 2025-08-27 RX ADMIN — ONDANSETRON 4 MG: 2 INJECTION, SOLUTION INTRAMUSCULAR; INTRAVENOUS at 08:01

## 2025-08-27 RX ADMIN — ROPIVACAINE HYDROCHLORIDE 20 ML: 5 INJECTION EPIDURAL; INFILTRATION; PERINEURAL at 06:22

## 2025-08-27 RX ADMIN — VANCOMYCIN HYDROCHLORIDE 1250 MG: 1.25 INJECTION, POWDER, LYOPHILIZED, FOR SOLUTION INTRAVENOUS at 05:57

## 2025-08-27 RX ADMIN — Medication 10 MG: at 07:40

## 2025-08-27 RX ADMIN — DEXAMETHASONE SODIUM PHOSPHATE 8 MG: 4 INJECTION, SOLUTION INTRAMUSCULAR; INTRAVENOUS at 07:17

## 2025-08-27 RX ADMIN — Medication 20 MG: at 07:29

## 2025-08-27 RX ADMIN — PREGABALIN 75 MG: 75 CAPSULE ORAL at 05:58

## 2025-08-27 RX ADMIN — SODIUM CHLORIDE, POTASSIUM CHLORIDE, SODIUM LACTATE AND CALCIUM CHLORIDE 500 ML: 600; 310; 30; 20 INJECTION, SOLUTION INTRAVENOUS at 05:57

## 2025-08-27 RX ADMIN — ACETAMINOPHEN 1000 MG: 1000 INJECTION INTRAVENOUS at 07:20

## 2025-08-27 RX ADMIN — DROPERIDOL 0.62 MG: 2.5 INJECTION, SOLUTION INTRAMUSCULAR; INTRAVENOUS at 07:35

## 2025-08-27 RX ADMIN — EPHEDRINE SULFATE 10 MG: 50 INJECTION INTRAVENOUS at 07:16

## 2025-08-27 RX ADMIN — FENTANYL CITRATE 50 MCG: 50 INJECTION, SOLUTION INTRAMUSCULAR; INTRAVENOUS at 06:18

## 2025-08-27 RX ADMIN — PROPOFOL 150 MG: 10 INJECTION, EMULSION INTRAVENOUS at 07:09

## 2025-08-27 RX ADMIN — OXYCODONE AND ACETAMINOPHEN 1 TABLET: 7.5; 325 TABLET ORAL at 09:02

## 2025-08-27 RX ADMIN — SODIUM CHLORIDE, POTASSIUM CHLORIDE, SODIUM LACTATE AND CALCIUM CHLORIDE: 600; 310; 30; 20 INJECTION, SOLUTION INTRAVENOUS at 08:10

## 2025-08-27 RX ADMIN — MELOXICAM 15 MG: 15 TABLET ORAL at 05:58

## 2025-08-29 ENCOUNTER — TELEPHONE (OUTPATIENT)
Dept: PREOP | Facility: HOSPITAL | Age: 70
End: 2025-08-29
Payer: MEDICARE

## (undated) DEVICE — DEV CONTRL TISS STRATAFIX SYMM PDS PLUS VIL CT-1 60CM

## (undated) DEVICE — GLV SURG SENSICARE PI MIC PF SZ7 LF STRL

## (undated) DEVICE — SUT VIC 1 CT1 36IN J947H

## (undated) DEVICE — KT DRN EVAC WND PVC PCH WTROC RND 10F400

## (undated) DEVICE — BNDG,ELSTC,MATRIX,STRL,6"X5YD,LF,HOOK&LP: Brand: MEDLINE

## (undated) DEVICE — THE STERILE LIGHT HANDLE COVER IS USED WITH STERIS SURGICAL LIGHTING AND VISUALIZATION SYSTEMS.

## (undated) DEVICE — SOL IRR NACL 0.9PCT 3000ML

## (undated) DEVICE — NEEDLE, QUINCKE 22GX3.5": Brand: MEDLINE INDUSTRIES, INC.

## (undated) DEVICE — DRSNG SURESITE WNDW 2.38X2.75

## (undated) DEVICE — 450 ML BOTTLE OF 0.05% CHLORHEXIDINE GLUCONATE IN 99.95% STERILE WATER FOR IRRIGATION, USP AND APPLICATOR.: Brand: IRRISEPT ANTIMICROBIAL WOUND LAVAGE

## (undated) DEVICE — PREP SOL POVIDONE/IODINE BT 4OZ

## (undated) DEVICE — TRAP FLD MINIVAC MEGADYNE 100ML

## (undated) DEVICE — DEV CONTRL TISS STRATAFIXSPIRALMNCRYL PLSPS2 REV3/0 45CM

## (undated) DEVICE — PK KN TOTL 40

## (undated) DEVICE — ANTIBACTERIAL UNDYED BRAIDED (POLYGLACTIN 910), SYNTHETIC ABSORBABLE SUTURE: Brand: COATED VICRYL

## (undated) DEVICE — UNDERGLV SURG BIOGEL INDICATOR LF PF 7.5

## (undated) DEVICE — PATIENT RETURN ELECTRODE, SINGLE-USE, CONTACT QUALITY MONITORING, ADULT, WITH 9FT CORD, FOR PATIENTS WEIGING OVER 33LBS. (15KG): Brand: MEGADYNE

## (undated) DEVICE — UNDERCAST PADDING: Brand: DEROYAL

## (undated) DEVICE — GLV SURG SENSICARE PI PF LF 7 GRN STRL

## (undated) DEVICE — DUAL CUT SAGITTAL BLADE

## (undated) DEVICE — YANKAUER,BULB TIP,W/O VENT,RIGID,STERILE: Brand: MEDLINE

## (undated) DEVICE — SYS SKIN EXOFIN WND CLS 4X22CM

## (undated) DEVICE — INTENDED TO SUPPORT AND MAINTAIN THE POSITION OF AN ANESTHETIZED PATIENT DURING SURGERY: Brand: HERMANTOR XL PINK KNEE POSITIONING PAD

## (undated) DEVICE — SYS CLS SKIN PREMIERPRO EXOFINFUSION 22CM

## (undated) DEVICE — Device: Brand: XPERIENCE

## (undated) DEVICE — 3M™ IOBAN™ 2 ANTIMICROBIAL INCISE DRAPE 6640EZ: Brand: IOBAN™ 2

## (undated) DEVICE — GLV SURG SENSICARE W/ALOE PF LF 7.5 STRL

## (undated) DEVICE — CMT BONE PALACOS R HI/VISC 1X40

## (undated) DEVICE — GLV SURG SENSICARE W/ALOE PF LF 8 STRL

## (undated) DEVICE — SKIN PREP TRAY 4 COMPARTM TRAY: Brand: MEDLINE INDUSTRIES, INC.

## (undated) DEVICE — APPL DURAPREP IODOPHOR APL 26ML

## (undated) DEVICE — GLV SURG BIOGEL M LTX PF 7 1/2